# Patient Record
Sex: FEMALE | URBAN - METROPOLITAN AREA
[De-identification: names, ages, dates, MRNs, and addresses within clinical notes are randomized per-mention and may not be internally consistent; named-entity substitution may affect disease eponyms.]

---

## 2020-10-30 LAB
C. TRACHOMATIS, EXTERNAL RESULT: NEGATIVE
N. GONORRHOEAE, EXTERNAL RESULT: NEGATIVE

## 2020-11-18 LAB
ABO, EXTERNAL RESULT: NORMAL
HEP B, EXTERNAL RESULT: NEGATIVE
HEPATITIS C ANTIBODY, EXTERNAL RESULT: NEGATIVE
HIV, EXTERNAL RESULT: NORMAL
RH FACTOR, EXTERNAL RESULT: POSITIVE
RPR, EXTERNAL RESULT: NEGATIVE
RUBELLA TITER, EXTERNAL RESULT: NORMAL

## 2021-04-06 ENCOUNTER — TELEPHONE (OUTPATIENT)
Dept: OBGYN CLINIC | Age: 36
End: 2021-04-06

## 2021-04-07 ENCOUNTER — INITIAL PRENATAL (OUTPATIENT)
Dept: OBGYN CLINIC | Age: 36
End: 2021-04-07
Payer: COMMERCIAL

## 2021-04-07 VITALS
WEIGHT: 160.4 LBS | HEART RATE: 90 BPM | SYSTOLIC BLOOD PRESSURE: 108 MMHG | BODY MASS INDEX: 26.73 KG/M2 | HEIGHT: 65 IN | DIASTOLIC BLOOD PRESSURE: 76 MMHG

## 2021-04-07 DIAGNOSIS — Z34.83 PRENATAL CARE, SUBSEQUENT PREGNANCY IN THIRD TRIMESTER: Primary | ICD-10-CM

## 2021-04-07 DIAGNOSIS — O09.523 MULTIGRAVIDA OF ADVANCED MATERNAL AGE IN THIRD TRIMESTER: ICD-10-CM

## 2021-04-07 PROCEDURE — 0500F INITIAL PRENATAL CARE VISIT: CPT | Performed by: OBSTETRICS & GYNECOLOGY

## 2021-04-07 PROCEDURE — 36415 COLL VENOUS BLD VENIPUNCTURE: CPT | Performed by: OBSTETRICS & GYNECOLOGY

## 2021-04-07 NOTE — PROGRESS NOTES
Temp-98. 2f infrared  Maternal emotional well being screening form completed and reviewed with patient. Current score is 4. Patient given referral to [address] [place] [address] ([phone]): No  3:00pm 4/7/21 Given Tdap (Adacel) vaccine 0.5mL IM  Site:Right deltoid. Lot #[id number]  Expiration Date: 01/29/23  Jay Bautista 47 #[id number]. Patient tolerated well. No reaction noted after 20 minutes. VIS sheet provided. Administered by:  Josefina Garcia

## 2021-04-08 LAB
ABO/RH: NORMAL
ANTIBODY SCREEN: NORMAL
BASOPHILS ABSOLUTE: 0 K/UL (ref 0–0.2)
BASOPHILS RELATIVE PERCENT: 0.2 %
EOSINOPHILS ABSOLUTE: 0.1 K/UL (ref 0–0.6)
EOSINOPHILS RELATIVE PERCENT: 1.6 %
GLUCOSE CHALLENGE: 133 MG/DL
HCT VFR BLD CALC: 36.9 % (ref 36–48)
HEMOGLOBIN: 12.5 G/DL (ref 12–16)
LYMPHOCYTES ABSOLUTE: 1.4 K/UL (ref 1–5.1)
LYMPHOCYTES RELATIVE PERCENT: 17.1 %
MCH RBC QN AUTO: 31.3 PG (ref 26–34)
MCHC RBC AUTO-ENTMCNC: 33.8 G/DL (ref 31–36)
MCV RBC AUTO: 92.5 FL (ref 80–100)
MONOCYTES ABSOLUTE: 0.6 K/UL (ref 0–1.3)
MONOCYTES RELATIVE PERCENT: 7.1 %
NEUTROPHILS ABSOLUTE: 5.8 K/UL (ref 1.7–7.7)
NEUTROPHILS RELATIVE PERCENT: 74 %
PDW BLD-RTO: 13.4 % (ref 12.4–15.4)
PLATELET # BLD: 172 K/UL (ref 135–450)
PMV BLD AUTO: 9.4 FL (ref 5–10.5)
RBC # BLD: 3.98 M/UL (ref 4–5.2)
WBC # BLD: 7.9 K/UL (ref 4–11)

## 2021-04-16 ENCOUNTER — NURSE ONLY (OUTPATIENT)
Dept: OBGYN CLINIC | Age: 36
End: 2021-04-16
Payer: COMMERCIAL

## 2021-04-16 VITALS
HEART RATE: 75 BPM | WEIGHT: 163.2 LBS | BODY MASS INDEX: 27.58 KG/M2 | TEMPERATURE: 97.1 F | SYSTOLIC BLOOD PRESSURE: 118 MMHG | DIASTOLIC BLOOD PRESSURE: 64 MMHG

## 2021-04-16 DIAGNOSIS — O24.410 DIET CONTROLLED GESTATIONAL DIABETES MELLITUS (GDM) IN SECOND TRIMESTER: ICD-10-CM

## 2021-04-16 DIAGNOSIS — O99.019 ANEMIA DURING PREGNANCY: Primary | ICD-10-CM

## 2021-04-16 PROCEDURE — 36415 COLL VENOUS BLD VENIPUNCTURE: CPT | Performed by: OBSTETRICS & GYNECOLOGY

## 2021-04-17 LAB
BASOPHILS ABSOLUTE: 0 K/UL (ref 0–0.2)
BASOPHILS RELATIVE PERCENT: 0.3 %
EOSINOPHILS ABSOLUTE: 0.1 K/UL (ref 0–0.6)
EOSINOPHILS RELATIVE PERCENT: 1.4 %
GLUCOSE FASTING: 61 MG/DL
GLUCOSE TOLERANCE TEST 1 HOUR: 133 MG/DL
GLUCOSE TOLERANCE TEST 2 HOUR: 124 MG/DL
GLUCOSE TOLERANCE TEST 3 HOUR: 99 MG/DL
HCT VFR BLD CALC: 35.5 % (ref 36–48)
HEMOGLOBIN: 11.8 G/DL (ref 12–16)
LYMPHOCYTES ABSOLUTE: 1.4 K/UL (ref 1–5.1)
LYMPHOCYTES RELATIVE PERCENT: 14.5 %
MCH RBC QN AUTO: 30.7 PG (ref 26–34)
MCHC RBC AUTO-ENTMCNC: 33.2 G/DL (ref 31–36)
MCV RBC AUTO: 92.6 FL (ref 80–100)
MONOCYTES ABSOLUTE: 0.7 K/UL (ref 0–1.3)
MONOCYTES RELATIVE PERCENT: 7.3 %
NEUTROPHILS ABSOLUTE: 7.2 K/UL (ref 1.7–7.7)
NEUTROPHILS RELATIVE PERCENT: 76.5 %
PDW BLD-RTO: 13 % (ref 12.4–15.4)
PLATELET # BLD: 176 K/UL (ref 135–450)
PMV BLD AUTO: 9.5 FL (ref 5–10.5)
RBC # BLD: 3.84 M/UL (ref 4–5.2)
WBC # BLD: 9.5 K/UL (ref 4–11)

## 2021-04-18 PROBLEM — O09.523 MULTIGRAVIDA OF ADVANCED MATERNAL AGE IN THIRD TRIMESTER: Status: ACTIVE | Noted: 2021-04-18

## 2021-04-18 PROBLEM — Z34.83 PRENATAL CARE, SUBSEQUENT PREGNANCY IN THIRD TRIMESTER: Status: ACTIVE | Noted: 2021-04-18

## 2021-04-19 NOTE — PROGRESS NOTES
28 y/o  female at 30 weeks 1 day gestation with [facility] 6/15/2021 presents for prenatal transfer of care. Pregnancy is complicated by late transfer of care and AMA  Patient is in process of moving from [place] to [place]. First pregnancy was relatively uncomplicated and ended in  at term (nuchal cord at delivery). Denies vaginal bleeding, loss of fluid, contractions, and decreased fetal movement. Denies headaches, vision changes and RUQ pain. Admits to mild baseline shortness of breath. Denies fever, chills, chest pain, nausea, vomiting, diarrhea, constipation, dysuria and hematuria. No Known Allergies  Current Outpatient Medications on File Prior to Visit   Medication Sig Dispense Refill    Prenatal Vit-Fe Fumarate-FA (PRENATAL 1+1 PO) Take by mouth       No current facility-administered medications on file prior to visit. Past Medical History:   Diagnosis Date    Abnormal Pap smear of cervix     Herpes simplex virus (HSV) infection      History reviewed. No pertinent surgical history. Social History     Tobacco Use    Smoking status: Never Smoker    Smokeless tobacco: Never Used   Substance Use Topics    Alcohol use: Not Currently    Drug use: Never     Family History   Problem Relation Age of Onset    Breast Cancer Paternal Grandmother     Hypertension Father     Cancer Mother      OB History    Para Term  AB Living   2 1 1     1   SAB TAB Ectopic Molar Multiple Live Births             1      # Outcome Date GA Lbr Sincere/2nd Weight Sex Delivery Anes PTL Lv   2 Current            1 Term 07/10/18    M Vag-Spont EPI  BERRY      Diagnosis Orders   1. Prenatal care, subsequent pregnancy in third trimester  CBC WITH AUTO DIFFERENTIAL    GLUCOSE CHALLENGE GESTATIONAL    TYPE AND SCREEN   2.  Multigravida of advanced maternal age in third trimester       Orders Placed This Encounter   Procedures    CBC WITH AUTO DIFFERENTIAL    GLUCOSE CHALLENGE GESTATIONAL    TYPE AND SCREEN     Records requested: lab work, prenatal record, ultrasound (had level 2 ultrasound at 18 weeks per patient). Schedule growth ultrasound in 2-4 weeks. Weekly NST, VINAY at 32-34 weeks.    Follow up prn and 2 weeks

## 2021-04-20 ENCOUNTER — ROUTINE PRENATAL (OUTPATIENT)
Dept: OBGYN CLINIC | Age: 36
End: 2021-04-20

## 2021-04-20 VITALS
WEIGHT: 164.6 LBS | HEART RATE: 101 BPM | BODY MASS INDEX: 27.82 KG/M2 | DIASTOLIC BLOOD PRESSURE: 70 MMHG | SYSTOLIC BLOOD PRESSURE: 101 MMHG

## 2021-04-20 DIAGNOSIS — Z34.83 PRENATAL CARE, SUBSEQUENT PREGNANCY IN THIRD TRIMESTER: Primary | ICD-10-CM

## 2021-04-20 DIAGNOSIS — O09.523 MULTIGRAVIDA OF ADVANCED MATERNAL AGE IN THIRD TRIMESTER: ICD-10-CM

## 2021-04-20 PROCEDURE — 0502F SUBSEQUENT PRENATAL CARE: CPT | Performed by: OBSTETRICS & GYNECOLOGY

## 2021-04-20 ASSESSMENT — PATIENT HEALTH QUESTIONNAIRE - PHQ9
SUM OF ALL RESPONSES TO PHQ QUESTIONS 1-9: 0
1. LITTLE INTEREST OR PLEASURE IN DOING THINGS: 0
SUM OF ALL RESPONSES TO PHQ QUESTIONS 1-9: 0
SUM OF ALL RESPONSES TO PHQ9 QUESTIONS 1 & 2: 0
2. FEELING DOWN, DEPRESSED OR HOPELESS: 0
SUM OF ALL RESPONSES TO PHQ QUESTIONS 1-9: 0

## 2021-04-20 NOTE — PROGRESS NOTES
Maternal emotional well being screening form completed and reviewed with patient. Current score is 1.    Temp.97.9

## 2021-04-24 PROBLEM — R73.09 ELEVATED GLUCOSE TOLERANCE TEST: Status: ACTIVE | Noted: 2021-04-24

## 2021-04-24 NOTE — PROGRESS NOTES
30 y/o  female at 32 weeks 0 days gestation with [facility] 6/15/2021 presents for prenatal visit. Pregnancy is complicated by late transfer of care and AMA. Patient is in process of moving from [place] to [place]. First pregnancy was relatively uncomplicated and ended in  at term (nuchal cord at delivery). Denies vaginal bleeding, loss of fluid, contractions, and decreased fetal movement. Admits to headaches and blurred vision. Feels symptoms may be due to lack of hydration--not enough water. Denies RUQ pain  Denies fever, chills, chest pain, nausea, vomiting, diarrhea, constipation, dysuria and hematuria. Diagnosis Orders   1. Prenatal care, subsequent pregnancy in third trimester     2. Multigravida of advanced maternal age in third trimester           Follow up prn and 2 weeks for prenatal visit. Weekly NST, VINAY at 32-34 weeks  Growth ultrasound in 2 weeks.

## 2021-05-05 ENCOUNTER — OFFICE VISIT (OUTPATIENT)
Dept: OBGYN CLINIC | Age: 36
End: 2021-05-05
Payer: COMMERCIAL

## 2021-05-05 ENCOUNTER — ROUTINE PRENATAL (OUTPATIENT)
Dept: OBGYN CLINIC | Age: 36
End: 2021-05-05
Payer: COMMERCIAL

## 2021-05-05 VITALS
HEART RATE: 88 BPM | DIASTOLIC BLOOD PRESSURE: 74 MMHG | BODY MASS INDEX: 28.22 KG/M2 | SYSTOLIC BLOOD PRESSURE: 118 MMHG | WEIGHT: 167 LBS

## 2021-05-05 DIAGNOSIS — R73.09 ELEVATED GLUCOSE TOLERANCE TEST: ICD-10-CM

## 2021-05-05 DIAGNOSIS — O09.523 MULTIGRAVIDA OF ADVANCED MATERNAL AGE IN THIRD TRIMESTER: ICD-10-CM

## 2021-05-05 DIAGNOSIS — O09.523 MULTIGRAVIDA OF ADVANCED MATERNAL AGE IN THIRD TRIMESTER: Primary | ICD-10-CM

## 2021-05-05 DIAGNOSIS — Z34.83 PRENATAL CARE, SUBSEQUENT PREGNANCY IN THIRD TRIMESTER: ICD-10-CM

## 2021-05-05 DIAGNOSIS — Z34.83 PRENATAL CARE, SUBSEQUENT PREGNANCY IN THIRD TRIMESTER: Primary | ICD-10-CM

## 2021-05-05 DIAGNOSIS — O98.513 HERPES SIMPLEX VIRUS TYPE 2 (HSV-2) INFECTION AFFECTING PREGNANCY IN THIRD TRIMESTER: ICD-10-CM

## 2021-05-05 DIAGNOSIS — B00.9 HERPES SIMPLEX VIRUS TYPE 2 (HSV-2) INFECTION AFFECTING PREGNANCY IN THIRD TRIMESTER: ICD-10-CM

## 2021-05-05 LAB
ABDOMINAL CIRCUMFERENCE: NORMAL
BIPARIETAL DIAMETER: NORMAL
ESTIMATED FETAL WEIGHT: NORMAL
FEMORAL DIAMETER: NORMAL
HC/AC: NORMAL
HEAD CIRCUMFERENCE: NORMAL

## 2021-05-05 PROCEDURE — 59025 FETAL NON-STRESS TEST: CPT | Performed by: OBSTETRICS & GYNECOLOGY

## 2021-05-05 PROCEDURE — 76816 OB US FOLLOW-UP PER FETUS: CPT | Performed by: OBSTETRICS & GYNECOLOGY

## 2021-05-05 PROCEDURE — 0502F SUBSEQUENT PRENATAL CARE: CPT | Performed by: OBSTETRICS & GYNECOLOGY

## 2021-05-05 RX ORDER — VALACYCLOVIR HYDROCHLORIDE 500 MG/1
500 TABLET, FILM COATED ORAL DAILY
Qty: 90 TABLET | Refills: 3 | Status: SHIPPED | OUTPATIENT
Start: 2021-05-05

## 2021-05-14 ENCOUNTER — OFFICE VISIT (OUTPATIENT)
Dept: OBGYN CLINIC | Age: 36
End: 2021-05-14
Payer: COMMERCIAL

## 2021-05-14 ENCOUNTER — ROUTINE PRENATAL (OUTPATIENT)
Dept: OBGYN CLINIC | Age: 36
End: 2021-05-14

## 2021-05-14 VITALS
WEIGHT: 167.6 LBS | SYSTOLIC BLOOD PRESSURE: 118 MMHG | BODY MASS INDEX: 28.32 KG/M2 | HEART RATE: 97 BPM | DIASTOLIC BLOOD PRESSURE: 80 MMHG

## 2021-05-14 DIAGNOSIS — O09.523 MULTIGRAVIDA OF ADVANCED MATERNAL AGE IN THIRD TRIMESTER: ICD-10-CM

## 2021-05-14 DIAGNOSIS — O09.523 MULTIGRAVIDA OF ADVANCED MATERNAL AGE IN THIRD TRIMESTER: Primary | ICD-10-CM

## 2021-05-14 DIAGNOSIS — Z34.83 PRENATAL CARE, SUBSEQUENT PREGNANCY IN THIRD TRIMESTER: Primary | ICD-10-CM

## 2021-05-14 LAB — GBS, EXTERNAL RESULT: NEGATIVE

## 2021-05-14 PROCEDURE — 0502F SUBSEQUENT PRENATAL CARE: CPT | Performed by: OBSTETRICS & GYNECOLOGY

## 2021-05-14 PROCEDURE — 76815 OB US LIMITED FETUS(S): CPT | Performed by: OBSTETRICS & GYNECOLOGY

## 2021-05-14 NOTE — PROGRESS NOTES
Return OB Office Visit    CC:   Chief Complaint   Patient presents with    Routine Prenatal Visit       HPI:  Pt seen and examined. No concerns/complaints. Denies VB, LOF. +FM. Occasional cramps. Maternal wellness questionnaire reviewed - no concerns today. Score 0. Objective:  /80   Pulse 97   Wt 167 lb 9.6 oz (76 kg)   LMP 2020 (Exact Date)   BMI 28.32 kg/m²   Gen: AO, NAD  Abd: Soft, NT  FHT: 130  SVE: 1/25/-3    Assessment/Plan:  29 y.o.  at 35w3d (Estimated Date of Delivery: 6/15/21) presents for DEEPA appointment:      Diagnosis Orders   1. Prenatal care, subsequent pregnancy in third trimester  Culture, Strep B Screen, Vaginal/Rectal   2.  Multigravida of advanced maternal age in third trimester       Doing well today, routine care  - FWB reassuring on modified BPP today  - GBS sent today    Dispo: RTC in 1 week  Nagi Hadley MD

## 2021-05-15 PROBLEM — B00.9 HERPES SIMPLEX VIRUS TYPE 2 (HSV-2) INFECTION AFFECTING PREGNANCY IN THIRD TRIMESTER: Status: ACTIVE | Noted: 2021-05-15

## 2021-05-15 PROBLEM — O98.513 HERPES SIMPLEX VIRUS TYPE 2 (HSV-2) INFECTION AFFECTING PREGNANCY IN THIRD TRIMESTER: Status: ACTIVE | Noted: 2021-05-15

## 2021-05-15 NOTE — PROGRESS NOTES
30 y/o  female at 34 weeks 1 day gestation with [facility] 6/15/2021 presents for prenatal visit. Pregnancy is complicated by late transfer of care, history of HSV 2 and AMA. Patient is in process of moving from [place] to [place]. First pregnancy was relatively uncomplicated and ended in  at term (nuchal cord at delivery). Denies vaginal bleeding, loss of fluid, contractions, and decreased fetal movement. Denies headaches, vision changes and RUQ pain  Denies fever, chills, chest pain, nausea, vomiting, diarrhea, constipation, dysuria and hematuria. NON STRESS TEST    DATE: 2021    : Osbaldo Valentin. REZA Falcon    COMPARISON: none    INDICATION:  AMA    IMPRESSION:  Fetal heart tones: 130's,  NST category: 1, reactive,  Tocometry: no contractions. OBSTETRIC ULTRASOUND GROWTH    DATE: 2021    PHYSICIAN: ISIDRO Falcon D.O.     SONOGRAPHER: Pearl Garcia [facility]    INDICATION: Growth    TYPE OF SCAN: abdominal    FINDINGS:  A single viable intrauterine pregnancy is noted in cephalic presentation. Cardiac and somatic activity are noted. The following values were obtained:   Fetal heart rate    142 bpm   BPD      8.49cm  49.9 %   Head Circumference    32.01cm  64.4 %    Abdominal Circumference   30.53cm  64.4 %   Femur Length     6.44cm  18.8 %   Amniotic fluid index    20.09cm   EFW      2392g  48.0 percentile    Amniotic fluid volume is normal. Based on sonographic criteria the estimated fetal age is 34 weeks and 4 days with EDC of 21. There is a 3 day discordance with the established EDC of 6/15/21. The patient has an anterior placenta that is adequate distance in relation to the internal cervical os. The evaluation of the lower uterine segment and cervix reveals normal appearing anatomy. The uterus is unremarkable/gravid. Maternal ovaries and adnexae are not well visualized due to the size of the uterus and patient's gravid state.     Anatomy seen includes: heart, stomach, kidneys, bladder    IMPRESSION:  Single live IUP in the third trimester. Adequate interval fetal growth. Imaging is limited secondary to fetal position. The patient is well aware of the limitations of ultrasound in the detection of anomalies. Diagnosis Orders   1. Prenatal care, subsequent pregnancy in third trimester  18492 - [place] FETAL NON-STRESS TEST   2. Multigravida of advanced maternal age in third trimester  0 - [place] FETAL NON-STRESS TEST   3. Herpes simplex virus type 2 (HSV-2) infection affecting pregnancy in third trimester     4. Elevated glucose tolerance test       Orders Placed This Encounter   Procedures    16973 - [place] FETAL NON-STRESS TEST     Rx Valtrex 500 mg QD  Reassuring fetal monitoring. Follow up prn and 1 week for prenatal visit and NST.

## 2021-05-17 LAB — GROUP B STREP CULTURE: NORMAL

## 2021-05-21 ENCOUNTER — OFFICE VISIT (OUTPATIENT)
Dept: OBGYN CLINIC | Age: 36
End: 2021-05-21
Payer: COMMERCIAL

## 2021-05-21 ENCOUNTER — ROUTINE PRENATAL (OUTPATIENT)
Dept: OBGYN CLINIC | Age: 36
End: 2021-05-21

## 2021-05-21 VITALS
BODY MASS INDEX: 28.56 KG/M2 | WEIGHT: 169 LBS | HEART RATE: 85 BPM | DIASTOLIC BLOOD PRESSURE: 78 MMHG | SYSTOLIC BLOOD PRESSURE: 120 MMHG

## 2021-05-21 DIAGNOSIS — Z34.83 PRENATAL CARE, SUBSEQUENT PREGNANCY IN THIRD TRIMESTER: Primary | ICD-10-CM

## 2021-05-21 DIAGNOSIS — O98.513 HERPES SIMPLEX VIRUS TYPE 2 (HSV-2) INFECTION AFFECTING PREGNANCY IN THIRD TRIMESTER: ICD-10-CM

## 2021-05-21 DIAGNOSIS — O09.523 MULTIGRAVIDA OF ADVANCED MATERNAL AGE IN THIRD TRIMESTER: ICD-10-CM

## 2021-05-21 DIAGNOSIS — O09.523 MULTIGRAVIDA OF ADVANCED MATERNAL AGE IN THIRD TRIMESTER: Primary | ICD-10-CM

## 2021-05-21 DIAGNOSIS — B00.9 HERPES SIMPLEX VIRUS TYPE 2 (HSV-2) INFECTION AFFECTING PREGNANCY IN THIRD TRIMESTER: ICD-10-CM

## 2021-05-21 PROCEDURE — 76815 OB US LIMITED FETUS(S): CPT | Performed by: OBSTETRICS & GYNECOLOGY

## 2021-05-21 PROCEDURE — 0502F SUBSEQUENT PRENATAL CARE: CPT | Performed by: OBSTETRICS & GYNECOLOGY

## 2021-05-21 NOTE — PROGRESS NOTES
Return OB Office Visit    CC:   Chief Complaint   Patient presents with    Routine Prenatal Visit       HPI:  Pt seen and examined. No concerns/complaints. Denies VB, LOF. +FM. Occasional cramps/Aurelio-Louis every 15 minutes at times. Otherwise doing OK today. Maternal wellness questionnaire reviewed - no concerns today. Score 0. Objective:  /78   Pulse 85   Wt 169 lb (76.7 kg)   LMP 2020 (Exact Date)   BMI 28.56 kg/m²   Gen: AO, NAD  Abd: Soft, NT  FHT: 130  SVE: 3    Assessment/Plan:  29 y.o.  at 36w3d (Estimated Date of Delivery: 6/15/21) presents for DEEPA appointment:     Diagnosis Orders   1. Prenatal care, subsequent pregnancy in third trimester     2. Multigravida of advanced maternal age in third trimester     3.  Herpes simplex virus type 2 (HSV-2) infection affecting pregnancy in third trimester       Doing well, routine care  - FWB reassuring today on modified BPP  - GBS negative  - continue valtrex    Dispo: RTC in 1 week  Baron Nathen MD

## 2021-05-27 ENCOUNTER — ROUTINE PRENATAL (OUTPATIENT)
Dept: OBGYN CLINIC | Age: 36
End: 2021-05-27
Payer: COMMERCIAL

## 2021-05-27 ENCOUNTER — OFFICE VISIT (OUTPATIENT)
Dept: OBGYN CLINIC | Age: 36
End: 2021-05-27
Payer: COMMERCIAL

## 2021-05-27 VITALS
SYSTOLIC BLOOD PRESSURE: 112 MMHG | BODY MASS INDEX: 28.63 KG/M2 | WEIGHT: 169.4 LBS | HEART RATE: 76 BPM | DIASTOLIC BLOOD PRESSURE: 78 MMHG

## 2021-05-27 DIAGNOSIS — O99.019 ANEMIA DURING PREGNANCY: ICD-10-CM

## 2021-05-27 DIAGNOSIS — B00.9 HERPES SIMPLEX VIRUS TYPE 2 (HSV-2) INFECTION AFFECTING PREGNANCY IN THIRD TRIMESTER: ICD-10-CM

## 2021-05-27 DIAGNOSIS — O09.523 MULTIGRAVIDA OF ADVANCED MATERNAL AGE IN THIRD TRIMESTER: Primary | ICD-10-CM

## 2021-05-27 DIAGNOSIS — O98.513 HERPES SIMPLEX VIRUS TYPE 2 (HSV-2) INFECTION AFFECTING PREGNANCY IN THIRD TRIMESTER: ICD-10-CM

## 2021-05-27 DIAGNOSIS — R73.09 ELEVATED GLUCOSE TOLERANCE TEST: ICD-10-CM

## 2021-05-27 DIAGNOSIS — Z34.83 PRENATAL CARE, SUBSEQUENT PREGNANCY IN THIRD TRIMESTER: Primary | ICD-10-CM

## 2021-05-27 DIAGNOSIS — O09.523 MULTIGRAVIDA OF ADVANCED MATERNAL AGE IN THIRD TRIMESTER: ICD-10-CM

## 2021-05-27 PROCEDURE — 0502F SUBSEQUENT PRENATAL CARE: CPT | Performed by: OBSTETRICS & GYNECOLOGY

## 2021-05-27 PROCEDURE — 59025 FETAL NON-STRESS TEST: CPT | Performed by: OBSTETRICS & GYNECOLOGY

## 2021-05-27 PROCEDURE — 76815 OB US LIMITED FETUS(S): CPT | Performed by: OBSTETRICS & GYNECOLOGY

## 2021-05-27 NOTE — PROGRESS NOTES
Temp-98.4F infrared  Maternal emotional well being screening form completed and reviewed with patient. Current score is 0. Patient given referral to [facility] ([phone]):  No

## 2021-05-28 NOTE — PROGRESS NOTES
29 y.o.  at 37w3d EGA Estimated Date of Delivery: 6/15/21 here for DEEPA:     Pt seen and examined. No concerns/complaints. Denies VB, LOF. Does admit to CNTX every 5-10 min for the past few days, sometimes uncomfortable. Endorses (+) FM. Denies fevers / chills / chest pain / shortness of breath. Denies HA, changes with vision, RUQ pain, edema. MWQ reviewed (Score:0). Objective:  /78   Pulse 76   Wt 169 lb 6.4 oz (76.8 kg)   LMP 2020 (Exact Date)   BMI 28.63 kg/m²   Gen: AO, NAD  Abd: Soft, NT, gravid   Ext: Mild LE edema  OMM: Increased lumbar lordosis    NST:  Reactive / Cat 1     Images:  OB ULTRASOUND: VINAY    DATE: 21    PHYSICIAN: PAT Leon D.O.    SONOGRAPHER: {blank single:[id number]::\"TIFFANY Mcbride RDMS\",\"LAY Larson RDMS\"    INDICATION: Fetal well being    TYPE OF SCAN: abdominal    FINDINGS:  A single viable intrauterine pregnancy is noted in cephalic presentation. Cardiac and somatic activity are noted. The following values were obtained:   Fetal heart rate 147bpm   Amniotic fluid index 20.18cm    IMPRESSION:   Single live IUP in the third trimester. VINAY 20.18cm. Assessment/Plan:   Diagnosis Orders   1. Prenatal care, subsequent pregnancy in third trimester     2. Multigravida of advanced maternal age in third trimester  [place] FETAL NON-STRESS TEST   3. Herpes simplex virus type 2 (HSV-2) infection affecting pregnancy in third trimester     4. Elevated glucose tolerance test     5. Anemia during pregnancy        - reassuring maternal / fetal status   - plan for growth at next visit (permission given to bring in  as he has not been in for apt yet)   - GBS (-)    Follow Up  Return OB precautions reviewed   Return in about 1 week (around 6/3/2021) for Return OB visit, Ultrasound, NST.     Roderick Search, DO

## 2021-06-02 ENCOUNTER — OFFICE VISIT (OUTPATIENT)
Dept: OBGYN CLINIC | Age: 36
End: 2021-06-02
Payer: COMMERCIAL

## 2021-06-02 ENCOUNTER — ROUTINE PRENATAL (OUTPATIENT)
Dept: OBGYN CLINIC | Age: 36
End: 2021-06-02
Payer: COMMERCIAL

## 2021-06-02 VITALS
BODY MASS INDEX: 29.34 KG/M2 | DIASTOLIC BLOOD PRESSURE: 72 MMHG | WEIGHT: 173.6 LBS | SYSTOLIC BLOOD PRESSURE: 128 MMHG | HEART RATE: 94 BPM

## 2021-06-02 DIAGNOSIS — R73.09 ELEVATED GLUCOSE TOLERANCE TEST: ICD-10-CM

## 2021-06-02 DIAGNOSIS — O98.513 HERPES SIMPLEX VIRUS TYPE 2 (HSV-2) INFECTION AFFECTING PREGNANCY IN THIRD TRIMESTER: ICD-10-CM

## 2021-06-02 DIAGNOSIS — Z34.83 PRENATAL CARE, SUBSEQUENT PREGNANCY IN THIRD TRIMESTER: Primary | ICD-10-CM

## 2021-06-02 DIAGNOSIS — B00.9 HERPES SIMPLEX VIRUS TYPE 2 (HSV-2) INFECTION AFFECTING PREGNANCY IN THIRD TRIMESTER: ICD-10-CM

## 2021-06-02 DIAGNOSIS — O34.10 UTERINE FIBROID DURING PREGNANCY, ANTEPARTUM: ICD-10-CM

## 2021-06-02 DIAGNOSIS — D25.9 UTERINE FIBROID DURING PREGNANCY, ANTEPARTUM: ICD-10-CM

## 2021-06-02 DIAGNOSIS — O09.523 MULTIGRAVIDA OF ADVANCED MATERNAL AGE IN THIRD TRIMESTER: ICD-10-CM

## 2021-06-02 PROCEDURE — 59025 FETAL NON-STRESS TEST: CPT | Performed by: OBSTETRICS & GYNECOLOGY

## 2021-06-02 PROCEDURE — 0502F SUBSEQUENT PRENATAL CARE: CPT | Performed by: OBSTETRICS & GYNECOLOGY

## 2021-06-02 PROCEDURE — 76816 OB US FOLLOW-UP PER FETUS: CPT | Performed by: OBSTETRICS & GYNECOLOGY

## 2021-06-02 NOTE — PROGRESS NOTES
Temp 97.2 Oral F    Maternal emotional well being screening form completed and reviewed with patient. Current score is 0.

## 2021-06-07 ENCOUNTER — OFFICE VISIT (OUTPATIENT)
Dept: OBGYN CLINIC | Age: 36
End: 2021-06-07
Payer: COMMERCIAL

## 2021-06-07 ENCOUNTER — ROUTINE PRENATAL (OUTPATIENT)
Dept: OBGYN CLINIC | Age: 36
End: 2021-06-07

## 2021-06-07 VITALS
HEART RATE: 83 BPM | WEIGHT: 172 LBS | SYSTOLIC BLOOD PRESSURE: 122 MMHG | DIASTOLIC BLOOD PRESSURE: 80 MMHG | BODY MASS INDEX: 29.07 KG/M2

## 2021-06-07 DIAGNOSIS — D25.9 UTERINE FIBROID DURING PREGNANCY, ANTEPARTUM: ICD-10-CM

## 2021-06-07 DIAGNOSIS — Z34.83 PRENATAL CARE, SUBSEQUENT PREGNANCY IN THIRD TRIMESTER: Primary | ICD-10-CM

## 2021-06-07 DIAGNOSIS — R73.09 ELEVATED GLUCOSE TOLERANCE TEST: ICD-10-CM

## 2021-06-07 DIAGNOSIS — O09.523 MULTIGRAVIDA OF ADVANCED MATERNAL AGE IN THIRD TRIMESTER: ICD-10-CM

## 2021-06-07 DIAGNOSIS — O98.513 HERPES SIMPLEX VIRUS TYPE 2 (HSV-2) INFECTION AFFECTING PREGNANCY IN THIRD TRIMESTER: ICD-10-CM

## 2021-06-07 DIAGNOSIS — B00.9 HERPES SIMPLEX VIRUS TYPE 2 (HSV-2) INFECTION AFFECTING PREGNANCY IN THIRD TRIMESTER: ICD-10-CM

## 2021-06-07 DIAGNOSIS — O34.10 UTERINE FIBROID DURING PREGNANCY, ANTEPARTUM: ICD-10-CM

## 2021-06-07 PROCEDURE — 0502F SUBSEQUENT PRENATAL CARE: CPT | Performed by: OBSTETRICS & GYNECOLOGY

## 2021-06-07 PROCEDURE — 76815 OB US LIMITED FETUS(S): CPT | Performed by: OBSTETRICS & GYNECOLOGY

## 2021-06-07 NOTE — PROGRESS NOTES
Temp 98.2 Oral F    Maternal emotional well being screening form completed and reviewed with patient. Current score is 0.

## 2021-06-07 NOTE — PROGRESS NOTES
30 y/o  female at 34 weeks 1 day gestation with [facility] 6/15/2021 presents for prenatal visit. Pregnancy is complicated by late transfer of care, uterine fibroids history of HSV 2 and AMA. Moving from [place] to [place]. First pregnancy was relatively uncomplicated and ended in  at term (nuchal cord at delivery). Denies vaginal bleeding, loss of fluid, regular contractions, and decreased fetal movement. Denies headaches, vision changes and RUQ pain  Denies fever, chills, chest pain, nausea, vomiting, diarrhea, constipation, dysuria and hematuria  Maternal Wellness Questionnaire reviewed--reassurance    OBSTETRIC ULTRASOUND GROWTH    DATE: 2021. PHYSICIAN: ISIDRO Falcon D.O.     SONOGRAPHER: Noel Jules RDMS    INDICATION: Growth    TYPE OF SCAN: abdominal    FINDINGS:  A single viable intrauterine pregnancy is noted in cephalic presentation. Cardiac activity  noted. The following values were obtained:   Fetal heart rate    157 bpm   BPD      9.11cm  41.6 %   Head Circumference    34.70cm  79.3 %    Abdominal Circumference   35.96cm  96.2 %   Femur Length     7.70cm  81.6 %   Amniotic fluid index    15.09cm   EFW      3778g  88.2 percentile    Amniotic fluid volume is normal. Based on sonographic criteria the estimated fetal age is 39 weeks and 1 day with EDC of 2021. There is a 7 day discordance with the established EDC of 06/15/2021. The patient has an anterior placenta that is adequate distance in relation to the internal cervical os. The evaluation of the lower uterine segment and cervix reveals normal appearing anatomy. The uterus is unremarkable/gravid. Uterine Myoma  noted  Measuring 4.47 cm x 2.62 cm x 4.16 cm. Maternal ovaries and adnexae are not well visualized due to the size of the uterus and patient's gravid state. Anatomy seen includes: heart, stomach, kidneys, bladder    IMPRESSION:  Single live IUP in the third trimester. Adequate interval fetal growth. Imaging is limited secondary to gestational age. The patient is well aware of the limitations of ultrasound in the detection of anomalies. Right lateral myoma measures 4.4 x 4.6 x 3.6      NON STRESS TEST     DATE: 6/2/2021     : Rin Falcon D.O.     COMPARISON: 5/27/2021     IMPRESSION:  Fetal heart tones: 130's,  NST category: 1, reactive,  Tocometry: occasional contractions. Diagnosis Orders   1. Prenatal care, subsequent pregnancy in third trimester  61276 - [place] FETAL NON-STRESS TEST   2. Multigravida of advanced maternal age in third trimester  0 - [place] FETAL NON-STRESS TEST   3. Herpes simplex virus type 2 (HSV-2) infection affecting pregnancy in third trimester     4. Elevated glucose tolerance test  24464 - [place] FETAL NON-STRESS TEST   5. Uterine fibroid during pregnancy, antepartum         Orders Placed This Encounter   Procedures    97228 - [place] FETAL NON-STRESS TEST     Labor precautions, kick counts  Follow up prn and 1 week for prenatal visit and fetal monitoring. Zeyad Adan

## 2021-06-08 NOTE — PROGRESS NOTES
28 y/o  female at 38 weeks 6 days gestation with [facility] 6/15/2021 presents for prenatal visit. Pregnancy is complicated by late transfer of care, history of HSV 2 and AMA. Patient recently moved from [place]    First pregnancy was relatively uncomplicated and ended in  at term (nuchal cord at delivery). Denies vaginal bleeding, loss of fluid, contractions, and decreased fetal movement. Denies headaches, vision changes and RUQ pain  Denies fever, chills, chest pain, nausea, vomiting, diarrhea, constipation, dysuria and hematuria. Maternal Wellness Questionnaire reviewed--no concerns    / vertex    OB ULTRASOUND: VINAY and NST    DATE: 2021. PHYSICIAN: ISIDRO Falcon D.O.    SONOGRAPHER: TIFFANY Mcbride RDMS    INDICATION: Fetal well being    TYPE OF SCAN: abdominal    FINDINGS:  A single viable intrauterine pregnancy is noted in cephalic presentation. Cardiac and somatic activity are noted. The following values were obtained:   Fetal heart rate 128 bpm   Amniotic fluid index 17.03cm    NON STRESS TEST    : ISIDRO Falcon D.O.    COMPARISON: 6/3/2021    IMPRESSION:  Fetal heart tones: 130's,  NST category: 1, reactive,  Tocometry: no contractions. IMPRESSION:   Single live IUP in the third trimester. VINAY 17.03cm. Reactive category 1 tracing    Imaging is limited secondary to gestational age. The patient is well aware of the limitations of ultrasound in the detection of anomalies. Diagnosis Orders   1. Prenatal care, subsequent pregnancy in third trimester     2. Multigravida of advanced maternal age in third trimester     3. Herpes simplex virus type 2 (HSV-2) infection affecting pregnancy in third trimester     4. Uterine fibroid during pregnancy, antepartum     5. Elevated glucose tolerance test       Labor precautions, kick counts  Induction of labor versus expectant management discussed. Risks and benefits discussed. Induction of labor scheduled for 2021. Follow up for pitocin induction

## 2021-06-09 ENCOUNTER — ANESTHESIA EVENT (OUTPATIENT)
Dept: LABOR AND DELIVERY | Age: 36
End: 2021-06-09
Payer: COMMERCIAL

## 2021-06-09 ENCOUNTER — ANESTHESIA (OUTPATIENT)
Dept: LABOR AND DELIVERY | Age: 36
End: 2021-06-09
Payer: COMMERCIAL

## 2021-06-09 ENCOUNTER — APPOINTMENT (OUTPATIENT)
Dept: LABOR AND DELIVERY | Age: 36
End: 2021-06-09
Payer: COMMERCIAL

## 2021-06-09 ENCOUNTER — HOSPITAL ENCOUNTER (INPATIENT)
Age: 36
LOS: 1 days | Discharge: HOME | End: 2021-06-10
Attending: OBSTETRICS & GYNECOLOGY | Admitting: OBSTETRICS & GYNECOLOGY
Payer: COMMERCIAL

## 2021-06-09 PROBLEM — Z37.9 NORMAL LABOR: Status: ACTIVE | Noted: 2021-06-09

## 2021-06-09 LAB
ABO/RH: NORMAL
AMPHETAMINE SCREEN, URINE: NORMAL
ANTIBODY SCREEN: NORMAL
BARBITURATE SCREEN URINE: NORMAL
BASOPHILS ABSOLUTE: 0 K/UL (ref 0–0.2)
BASOPHILS RELATIVE PERCENT: 0.3 %
BENZODIAZEPINE SCREEN, URINE: NORMAL
BUPRENORPHINE URINE: NORMAL
CANNABINOID SCREEN URINE: NORMAL
COCAINE METABOLITE SCREEN URINE: NORMAL
EOSINOPHILS ABSOLUTE: 0.1 K/UL (ref 0–0.6)
EOSINOPHILS RELATIVE PERCENT: 1.6 %
HCT VFR BLD CALC: 35.9 % (ref 36–48)
HEMOGLOBIN: 12.1 G/DL (ref 12–16)
LYMPHOCYTES ABSOLUTE: 1.6 K/UL (ref 1–5.1)
LYMPHOCYTES RELATIVE PERCENT: 18.5 %
Lab: NORMAL
MCH RBC QN AUTO: 30.3 PG (ref 26–34)
MCHC RBC AUTO-ENTMCNC: 33.6 G/DL (ref 31–36)
MCV RBC AUTO: 90.1 FL (ref 80–100)
METHADONE SCREEN, URINE: NORMAL
MONOCYTES ABSOLUTE: 0.7 K/UL (ref 0–1.3)
MONOCYTES RELATIVE PERCENT: 8 %
NEUTROPHILS ABSOLUTE: 6.2 K/UL (ref 1.7–7.7)
NEUTROPHILS RELATIVE PERCENT: 71.6 %
OPIATE SCREEN URINE: NORMAL
OXYCODONE URINE: NORMAL
PDW BLD-RTO: 13.5 % (ref 12.4–15.4)
PH UA: 6
PHENCYCLIDINE SCREEN URINE: NORMAL
PLATELET # BLD: 170 K/UL (ref 135–450)
PMV BLD AUTO: 9.7 FL (ref 5–10.5)
PROPOXYPHENE SCREEN: NORMAL
RBC # BLD: 3.99 M/UL (ref 4–5.2)
WBC # BLD: 8.6 K/UL (ref 4–11)

## 2021-06-09 PROCEDURE — 86850 RBC ANTIBODY SCREEN: CPT

## 2021-06-09 PROCEDURE — 85025 COMPLETE CBC W/AUTO DIFF WBC: CPT

## 2021-06-09 PROCEDURE — 10907ZC DRAINAGE OF AMNIOTIC FLUID, THERAPEUTIC FROM PRODUCTS OF CONCEPTION, VIA NATURAL OR ARTIFICIAL OPENING: ICD-10-PCS | Performed by: OBSTETRICS & GYNECOLOGY

## 2021-06-09 PROCEDURE — 2500000003 HC RX 250 WO HCPCS: Performed by: NURSE ANESTHETIST, CERTIFIED REGISTERED

## 2021-06-09 PROCEDURE — 2580000003 HC RX 258: Performed by: OBSTETRICS & GYNECOLOGY

## 2021-06-09 PROCEDURE — 80307 DRUG TEST PRSMV CHEM ANLYZR: CPT

## 2021-06-09 PROCEDURE — 0HQ9XZZ REPAIR PERINEUM SKIN, EXTERNAL APPROACH: ICD-10-PCS | Performed by: OBSTETRICS & GYNECOLOGY

## 2021-06-09 PROCEDURE — 0UBGXZZ EXCISION OF VAGINA, EXTERNAL APPROACH: ICD-10-PCS | Performed by: OBSTETRICS & GYNECOLOGY

## 2021-06-09 PROCEDURE — 86900 BLOOD TYPING SEROLOGIC ABO: CPT

## 2021-06-09 PROCEDURE — 1220000000 HC SEMI PRIVATE OB R&B

## 2021-06-09 PROCEDURE — 86901 BLOOD TYPING SEROLOGIC RH(D): CPT

## 2021-06-09 PROCEDURE — 7200000001 HC VAGINAL DELIVERY

## 2021-06-09 PROCEDURE — 51701 INSERT BLADDER CATHETER: CPT

## 2021-06-09 PROCEDURE — 6360000002 HC RX W HCPCS: Performed by: OBSTETRICS & GYNECOLOGY

## 2021-06-09 PROCEDURE — 3700000025 EPIDURAL BLOCK: Performed by: ANESTHESIOLOGY

## 2021-06-09 PROCEDURE — 59400 OBSTETRICAL CARE: CPT | Performed by: OBSTETRICS & GYNECOLOGY

## 2021-06-09 PROCEDURE — 88304 TISSUE EXAM BY PATHOLOGIST: CPT

## 2021-06-09 PROCEDURE — 86780 TREPONEMA PALLIDUM: CPT

## 2021-06-09 PROCEDURE — 6370000000 HC RX 637 (ALT 250 FOR IP): Performed by: OBSTETRICS & GYNECOLOGY

## 2021-06-09 PROCEDURE — 3E033VJ INTRODUCTION OF OTHER HORMONE INTO PERIPHERAL VEIN, PERCUTANEOUS APPROACH: ICD-10-PCS | Performed by: OBSTETRICS & GYNECOLOGY

## 2021-06-09 RX ORDER — BUPIVACAINE HYDROCHLORIDE 5 MG/ML
INJECTION, SOLUTION EPIDURAL; INTRACAUDAL PRN
Status: DISCONTINUED | OUTPATIENT
Start: 2021-06-09 | End: 2021-06-09 | Stop reason: SDUPTHER

## 2021-06-09 RX ORDER — IBUPROFEN 800 MG/1
800 TABLET ORAL EVERY 8 HOURS
Status: DISCONTINUED | OUTPATIENT
Start: 2021-06-09 | End: 2021-06-10 | Stop reason: HOSPADM

## 2021-06-09 RX ORDER — DOCUSATE SODIUM 100 MG/1
100 CAPSULE, LIQUID FILLED ORAL 2 TIMES DAILY
Status: DISCONTINUED | OUTPATIENT
Start: 2021-06-09 | End: 2021-06-09

## 2021-06-09 RX ORDER — SODIUM CHLORIDE, SODIUM LACTATE, POTASSIUM CHLORIDE, AND CALCIUM CHLORIDE .6; .31; .03; .02 G/100ML; G/100ML; G/100ML; G/100ML
1000 INJECTION, SOLUTION INTRAVENOUS PRN
Status: DISCONTINUED | OUTPATIENT
Start: 2021-06-09 | End: 2021-06-09

## 2021-06-09 RX ORDER — HYDROCODONE BITARTRATE AND ACETAMINOPHEN 5; 325 MG/1; MG/1
1 TABLET ORAL EVERY 6 HOURS PRN
Status: DISCONTINUED | OUTPATIENT
Start: 2021-06-09 | End: 2021-06-10 | Stop reason: HOSPADM

## 2021-06-09 RX ORDER — SODIUM CHLORIDE 9 MG/ML
25 INJECTION, SOLUTION INTRAVENOUS PRN
Status: DISCONTINUED | OUTPATIENT
Start: 2021-06-09 | End: 2021-06-10 | Stop reason: HOSPADM

## 2021-06-09 RX ORDER — SODIUM CHLORIDE 0.9 % (FLUSH) 0.9 %
5-40 SYRINGE (ML) INJECTION EVERY 12 HOURS SCHEDULED
Status: DISCONTINUED | OUTPATIENT
Start: 2021-06-09 | End: 2021-06-10 | Stop reason: HOSPADM

## 2021-06-09 RX ORDER — HYDROCODONE BITARTRATE AND ACETAMINOPHEN 5; 325 MG/1; MG/1
2 TABLET ORAL EVERY 6 HOURS PRN
Status: DISCONTINUED | OUTPATIENT
Start: 2021-06-09 | End: 2021-06-10 | Stop reason: HOSPADM

## 2021-06-09 RX ORDER — SODIUM CHLORIDE, SODIUM LACTATE, POTASSIUM CHLORIDE, AND CALCIUM CHLORIDE .6; .31; .03; .02 G/100ML; G/100ML; G/100ML; G/100ML
500 INJECTION, SOLUTION INTRAVENOUS PRN
Status: DISCONTINUED | OUTPATIENT
Start: 2021-06-09 | End: 2021-06-09

## 2021-06-09 RX ORDER — DIPHENHYDRAMINE HYDROCHLORIDE 50 MG/ML
25 INJECTION INTRAMUSCULAR; INTRAVENOUS EVERY 4 HOURS PRN
Status: DISCONTINUED | OUTPATIENT
Start: 2021-06-09 | End: 2021-06-09

## 2021-06-09 RX ORDER — LANOLIN 100 %
OINTMENT (GRAM) TOPICAL PRN
Status: DISCONTINUED | OUTPATIENT
Start: 2021-06-09 | End: 2021-06-10 | Stop reason: HOSPADM

## 2021-06-09 RX ORDER — DOCUSATE SODIUM 100 MG/1
100 CAPSULE, LIQUID FILLED ORAL 2 TIMES DAILY
Status: DISCONTINUED | OUTPATIENT
Start: 2021-06-09 | End: 2021-06-10 | Stop reason: HOSPADM

## 2021-06-09 RX ORDER — SODIUM CHLORIDE 0.9 % (FLUSH) 0.9 %
5-40 SYRINGE (ML) INJECTION EVERY 12 HOURS SCHEDULED
Status: DISCONTINUED | OUTPATIENT
Start: 2021-06-09 | End: 2021-06-09

## 2021-06-09 RX ORDER — SODIUM CHLORIDE, SODIUM LACTATE, POTASSIUM CHLORIDE, CALCIUM CHLORIDE 600; 310; 30; 20 MG/100ML; MG/100ML; MG/100ML; MG/100ML
INJECTION, SOLUTION INTRAVENOUS CONTINUOUS
Status: DISCONTINUED | OUTPATIENT
Start: 2021-06-09 | End: 2021-06-09

## 2021-06-09 RX ORDER — SODIUM CHLORIDE 9 MG/ML
25 INJECTION, SOLUTION INTRAVENOUS PRN
Status: DISCONTINUED | OUTPATIENT
Start: 2021-06-09 | End: 2021-06-09

## 2021-06-09 RX ORDER — FAMOTIDINE 20 MG/1
20 TABLET, FILM COATED ORAL 2 TIMES DAILY PRN
Status: DISCONTINUED | OUTPATIENT
Start: 2021-06-09 | End: 2021-06-10 | Stop reason: HOSPADM

## 2021-06-09 RX ORDER — ACETAMINOPHEN 325 MG/1
650 TABLET ORAL EVERY 4 HOURS PRN
Status: DISCONTINUED | OUTPATIENT
Start: 2021-06-09 | End: 2021-06-10 | Stop reason: HOSPADM

## 2021-06-09 RX ORDER — ONDANSETRON 2 MG/ML
4 INJECTION INTRAMUSCULAR; INTRAVENOUS EVERY 6 HOURS PRN
Status: DISCONTINUED | OUTPATIENT
Start: 2021-06-09 | End: 2021-06-10 | Stop reason: HOSPADM

## 2021-06-09 RX ORDER — SIMETHICONE 80 MG
80 TABLET,CHEWABLE ORAL EVERY 6 HOURS PRN
Status: DISCONTINUED | OUTPATIENT
Start: 2021-06-09 | End: 2021-06-10 | Stop reason: HOSPADM

## 2021-06-09 RX ORDER — ACETAMINOPHEN 325 MG/1
650 TABLET ORAL EVERY 4 HOURS PRN
Status: DISCONTINUED | OUTPATIENT
Start: 2021-06-09 | End: 2021-06-09

## 2021-06-09 RX ORDER — FERROUS SULFATE 325(65) MG
325 TABLET ORAL
Status: DISCONTINUED | OUTPATIENT
Start: 2021-06-10 | End: 2021-06-10 | Stop reason: HOSPADM

## 2021-06-09 RX ORDER — SODIUM CHLORIDE 0.9 % (FLUSH) 0.9 %
5-40 SYRINGE (ML) INJECTION PRN
Status: DISCONTINUED | OUTPATIENT
Start: 2021-06-09 | End: 2021-06-09

## 2021-06-09 RX ORDER — SODIUM CHLORIDE 0.9 % (FLUSH) 0.9 %
5-40 SYRINGE (ML) INJECTION PRN
Status: DISCONTINUED | OUTPATIENT
Start: 2021-06-09 | End: 2021-06-10 | Stop reason: HOSPADM

## 2021-06-09 RX ORDER — SODIUM CHLORIDE, SODIUM LACTATE, POTASSIUM CHLORIDE, CALCIUM CHLORIDE 600; 310; 30; 20 MG/100ML; MG/100ML; MG/100ML; MG/100ML
INJECTION, SOLUTION INTRAVENOUS CONTINUOUS
Status: DISCONTINUED | OUTPATIENT
Start: 2021-06-09 | End: 2021-06-10 | Stop reason: HOSPADM

## 2021-06-09 RX ORDER — ONDANSETRON 2 MG/ML
4 INJECTION INTRAMUSCULAR; INTRAVENOUS EVERY 6 HOURS PRN
Status: DISCONTINUED | OUTPATIENT
Start: 2021-06-09 | End: 2021-06-09

## 2021-06-09 RX ORDER — BUPIVACAINE HYDROCHLORIDE 2.5 MG/ML
INJECTION, SOLUTION EPIDURAL; INFILTRATION; INTRACAUDAL PRN
Status: DISCONTINUED | OUTPATIENT
Start: 2021-06-09 | End: 2021-06-09 | Stop reason: SDUPTHER

## 2021-06-09 RX ADMIN — SODIUM CHLORIDE, POTASSIUM CHLORIDE, SODIUM LACTATE AND CALCIUM CHLORIDE: 600; 310; 30; 20 INJECTION, SOLUTION INTRAVENOUS at 18:56

## 2021-06-09 RX ADMIN — SODIUM CHLORIDE, POTASSIUM CHLORIDE, SODIUM LACTATE AND CALCIUM CHLORIDE: 600; 310; 30; 20 INJECTION, SOLUTION INTRAVENOUS at 07:40

## 2021-06-09 RX ADMIN — SODIUM CHLORIDE, POTASSIUM CHLORIDE, SODIUM LACTATE AND CALCIUM CHLORIDE: 600; 310; 30; 20 INJECTION, SOLUTION INTRAVENOUS at 13:30

## 2021-06-09 RX ADMIN — Medication 1 MILLI-UNITS/MIN: at 08:37

## 2021-06-09 RX ADMIN — BUPIVACAINE HYDROCHLORIDE 6 ML: 2.5 INJECTION, SOLUTION EPIDURAL; INFILTRATION; INTRACAUDAL; PERINEURAL at 14:22

## 2021-06-09 RX ADMIN — Medication 166.7 ML: at 18:43

## 2021-06-09 RX ADMIN — Medication 15 ML/HR: at 14:24

## 2021-06-09 RX ADMIN — BUPIVACAINE HYDROCHLORIDE 8 ML: 5 INJECTION, SOLUTION EPIDURAL; INTRACAUDAL; PERINEURAL at 18:22

## 2021-06-09 RX ADMIN — BENZOCAINE AND LEVOMENTHOL: 200; 5 SPRAY TOPICAL at 23:00

## 2021-06-09 RX ADMIN — WITCH HAZEL 40 EACH: 500 SOLUTION RECTAL; TOPICAL at 23:00

## 2021-06-09 RX ADMIN — Medication 87.3 MILLI-UNITS/MIN: at 18:56

## 2021-06-09 RX ADMIN — IBUPROFEN 800 MG: 800 TABLET, FILM COATED ORAL at 21:10

## 2021-06-09 ASSESSMENT — PAIN SCALES - GENERAL: PAINLEVEL_OUTOF10: 0

## 2021-06-09 NOTE — ANESTHESIA PROCEDURE NOTES
Epidural Block    Patient location during procedure: OB  Start time: 6/9/2021 2:09 PM  End time: 6/9/2021 2:28 PM  Reason for block: labor epidural  Staffing  Performed: resident/CRNA   Anesthesiologist: Grupo Hook MD  Resident/CRNA: PADMAJA Wyatt CRNA  Preanesthetic Checklist  Completed: patient identified, IV checked, risks and benefits discussed, monitors and equipment checked, pre-op evaluation, timeout performed, anesthesia consent given, oxygen available and patient being monitored  Epidural  Patient position: sitting  Prep: ChloraPrep and site prepped and draped  Patient monitoring: cardiac monitor, continuous pulse ox and frequent blood pressure checks  Approach: midline  Location: lumbar (1-5)  Injection technique: CYNTHIA air  Provider prep: mask and sterile gloves  Needle  Needle type: Tuohy   Needle gauge: 17 G  Needle length: 3.5 in  Needle insertion depth: 5 cm  Catheter type: side hole  Catheter size: 19 G (20 G)  Catheter at skin depth: 12 cm  Test dose: negative (3ml 1.5% lido with epi)  Assessment  Sensory level: T8  Hemodynamics: stable  Attempts: 1  Additional Notes  DPE:  25 gauge pencil point needle through touhy X1. Positive CSF.

## 2021-06-09 NOTE — H&P
Department of Obstetrics and Gynecology  Attending Obstetrics History and Physical        CHIEF COMPLAINT:  Elective induction    HISTORY OF PRESENT ILLNESS:      The patient is a 29 y.o.  2 parity 26 female at 44 weeks 1 day gestation with [facility] [place] 6/15/2021 who presents for induction of labor secondary to [place] at term and HSV. Patient denies vaginal bleeding, loss of fluid, regular contractions, and decreased fetal movement. Pregnancy has been complicated by uterine fibroid, elevated 1 hour GTT, HSV and  AMA status. Patient presents with a chief complaint as above and is being admitted for induction    DATES:    Last Menstrual Period:  2020  Estimated Due Date:  6/15/2021    PRENATAL CARE:    Provider:  ISIDRO Falcon D.O., [facility]    Blood Type/Rh:  O positive  Antibody Screen:  negative  Rubella:  immune  RPR:  Non-reactive  Hepatitis B Surface Antigen: non-reactive  HIV:  Non-reactive  Gonorrhea:  negative  Chlamydia:  negative  MSAFP/Multiple Markers:  Date:  ; Results:    U/S Structural Survery:  See report  1 hour Glucose Tolerance Test:  133  Group B Strep:  negative  3 Hour Glucose Tolerance Test:  61/133/124/99    PAST OB HISTORY        Depression:  No      Post-partum depression:  No      Diabetes:  No      Gestational Diabetes:  No      Thyroid Disease:  No      Chronic HTN:  No      Gestation HTN:  No      Pre-eclampsia:  No      Seizure disorder:  No      Asthma:  No      Clotting disorder:  No      :  No      Tubal ligation:  No      D & C:  No      Cerclage:  No      LEEP:  No      Myomectomy:  No    OB History    Para Term  AB Living   2 2 2     2   SAB TAB Ectopic Molar Multiple Live Births           0 2      # Outcome Date GA Lbr Sincere/2nd Weight Sex Delivery Anes PTL Lv   2 Term 21 39w1d 00:21 / 00:03 7 lb 8 oz (3.402 kg) F Vag-Spont EPI N BERRY      Birth Comments: lee; HUGS: 605 ; Bands: [id number]   1 Term 07/10/18    M Vag-Spont EPI  BERRY Oral   Weight:       Height:         General appearance:  awake, alert, cooperative, no apparent distress, and appears stated age  Neurologic:  Mental Status Exam:  Level of Alertness:   awake  Orientation:   person, place, time  Memory:   normal  Fund of Knowledge:  normal  Attention/Concentration:  normal  Language:  normal  Lungs:  No increased work of breathing, good air exchange, clear to auscultation bilaterally, no crackles or wheezing  Heart:  normal S1 and S2  Abdomen:  soft, non-distended and non-tender  Fetal heart rate:  Baseline Heart Rate 1, accelerations:  present  long term variability:  moderate  decelerations:  absent  Pelvis:  External Genitalia: General appearance; normal, Hair distribution; normal, Lesions absent  Cervix:    DILATION:  2 cm  EFFACEMENT:   50%  STATION:  -2 cm    Contraction frequency:  irregular  Membranes:  Intact    Pelvic Ultrasound:      General Labs:      ASSESSMENT AND PLAN:    The patient is a 29 y.o.  2 parity 1001 at 44 weeks 1 day gestation  AMA  HSV    Plan:   Admission   Pitocin induction       Vidhya Falcon D.O.

## 2021-06-09 NOTE — L&D DELIVERY SUMMARY NOTE
Department of Obstetrics and Gynecology  Spontaneous Vaginal Delivery Note      Pre-operative Diagnosis:  Term pregnancy, induced labor, single fetus and pregnancy complicated by: AMA, uterine fibroid, elevated 1 hour GTT    Post-operative Diagnosis:  Living  infant(s) and Female, vaginal cyst, nuchal cord    Information for the patient's :  Katherine Barnes [[id number]]                    Infant Wt:   Information for the patient's :  Katherine Barnes [[id number]]           APGARS:     Information for the patient's :  Katherine Barnes [[id number]]           Anesthesia:  epidural anesthesia    Application and Delivery:  . 30 y/o  female at 44 weeks gestation presented to L&D for induction of labor. Pitocin was administered on arrival and adequate contraction pattern achieved. Amniotomy was performed after several hours of pitocin. Epidural was administered. Patient progressed readily throughout the afternoon and evening. Upon reaching complete dilatation and effacement, patient pushed effectively for less than 2 minutes and delivered a viable female  over an intact perineum from a HENNA presentation. A single nuchal cord was reduced on the perineum. The shoulders and body were delivered immediately after the head and  was placed on maternal abdomen for suction ing and drying. After brief delay, cord was clamped and cut. A segment of cord was collected and later discarded due to fetal wellbeing. Cord blood was collected. Placenta delivered spontaneously intact with 3 vessel cord. The lower uterine segment was cleared of all clots and debris. The Uterus was found to be firm and hemostasis assured. No cervical, vaginal or labial lacerations were noted. A first degree perineal laceration was repaired in the usual fashion.   During the repair a vaginal cyst located mid distal posterior vaginal wall was excised in total.  Hemostasis was assured. Rectum was found to be intact. Sponge, lap and needle counts were correct x 2. Patient tolerated the procedure well and was left to recover in L&D room in stable condition. Delivery Summary:  Labor & Delivery Summary  Dilation Complete Date: 06/09/21  Dilation Complete Time: 1833    Specimen:  Placenta sent to pathology for hold specimen, vaginal cyst     Intake/Output:     EBL: 300 cc    Condition:  infant stable and mother stable    Blood Type and Rh: O POS        Rubella Immunity Status:   Immune           Infant Feeding:    breast    Attending Attestation: I performed the procedure.

## 2021-06-09 NOTE — ANESTHESIA PRE PROCEDURE
Department of Anesthesiology  Preprocedure Note       Name:  Dorothea Parra   Age:  29 y.o.  :  1985                                          MRN:  [id number]         Date:  2021      Surgeon: * No surgeons listed *    Procedure: * No procedures listed *    Medications prior to admission:   Prior to Admission medications    Medication Sig Start Date End Date Taking?  Authorizing Provider   valACYclovir (VALTREX) 500 MG tablet Take 1 tablet by mouth daily 21  Yes Susie Severe Federer, DO   Prenatal Vit-Fe Fumarate-FA (PRENATAL 1+1 PO) Take by mouth   Yes Historical Provider, MD       Current medications:    Current Facility-Administered Medications   Medication Dose Route Frequency Provider Last Rate Last Admin    lactated ringers infusion   Intravenous Continuous Susie Severe Federer,  mL/hr at 21 0740 New Bag at 21 0740    lactated ringers bolus  500 mL Intravenous PRN Susie Severe Federer, DO        Or    lactated ringers bolus  1,000 mL Intravenous PRN Susie Severe Federer, DO        sodium chloride flush 0.9 % injection 5-40 mL  5-40 mL Intravenous 2 times per day Susie Severe Federer, DO        sodium chloride flush 0.9 % injection 5-40 mL  5-40 mL Intravenous PRN Susie Severe Federer, DO        0.9 % sodium chloride infusion  25 mL Intravenous PRN Susie Severe Federer, DO        acetaminophen (TYLENOL) tablet 650 mg  650 mg Oral Q4H PRN Susie Severe Federer, DO        diphenhydrAMINE (BENADRYL) injection 25 mg  25 mg Intravenous Q4H PRN Susie Severe Federer, DO        docusate sodium (COLACE) capsule 100 mg  100 mg Oral BID Susie Severe Federer, DO        ondansetron [facility]) injection 4 mg  4 mg Intravenous Q6H PRN Susie Severe Federer, DO        benzocaine-menthol (DERMOPLAST) 20-0.5 % spray   Topical PRN Susie Severe Federer, DO        oxytocin (PITOCIN) 30 units in 500 mL infusion  1-20 stephanie-units/min Intravenous Continuous Susie Severe Federer, DO 2 mL/hr at Date of last solid food consumption: 06/09/21    BMI:   Wt Readings from Last 3 Encounters:   06/09/21 171 lb (77.6 kg)   06/07/21 172 lb (78 kg)   06/02/21 173 lb 9.6 oz (78.7 kg)     Body mass index is 29.35 kg/m². CBC:   Lab Results   Component Value Date    WBC 8.6 06/09/2021    RBC 3.99 06/09/2021    HGB 12.1 06/09/2021    HCT 35.9 06/09/2021    MCV 90.1 06/09/2021    RDW 13.5 06/09/2021     06/09/2021       CMP: No results found for: NA, K, CL, CO2, BUN, CREATININE, GFRAA, AGRATIO, LABGLOM, GLUCOSE, PROT, CALCIUM, BILITOT, ALKPHOS, AST, ALT    POC Tests: No results for input(s): POCGLU, POCNA, POCK, POCCL, POCBUN, POCHEMO, POCHCT in the last 72 hours. Coags: No results found for: PROTIME, INR, APTT    HCG (If Applicable): No results found for: PREGTESTUR, PREGSERUM, HCG, HCGQUANT     ABGs: No results found for: PHART, PO2ART, ZLA3XOR, OYN7RZI, BEART, N3DCHKGG     Type & Screen (If Applicable):  No results found for: LABABO, LABRH    Drug/Infectious Status (If Applicable):  No results found for: HIV, HEPCAB    COVID-19 Screening (If Applicable): No results found for: COVID19        Anesthesia Evaluation  Patient summary reviewed and Nursing notes reviewed no history of anesthetic complications:   Airway: Mallampati: II        Dental:          Pulmonary:Negative Pulmonary ROS                              Cardiovascular:Negative CV ROS                      Neuro/Psych:   Negative Neuro/Psych ROS              GI/Hepatic/Renal: Neg GI/Hepatic/Renal ROS            Endo/Other: Negative Endo/Other ROS                    Abdominal:           Vascular: negative vascular ROS. Anesthesia Plan      epidural     ASA 2     (Benefits and risks of ETIENNE were discussed and agreed upon. All questions were answered, and verbal consent was obtained.)        Anesthetic plan and risks discussed with patient.                       PADMAJA Fuentes - CRNA   6/9/2021

## 2021-06-10 VITALS
WEIGHT: 171 LBS | RESPIRATION RATE: 16 BRPM | DIASTOLIC BLOOD PRESSURE: 79 MMHG | SYSTOLIC BLOOD PRESSURE: 120 MMHG | BODY MASS INDEX: 29.19 KG/M2 | HEART RATE: 86 BPM | TEMPERATURE: 98 F | HEIGHT: 64 IN

## 2021-06-10 LAB
BASOPHILS ABSOLUTE: 0 K/UL (ref 0–0.2)
BASOPHILS RELATIVE PERCENT: 0.4 %
EOSINOPHILS ABSOLUTE: 0.1 K/UL (ref 0–0.6)
EOSINOPHILS RELATIVE PERCENT: 0.8 %
HCT VFR BLD CALC: 31.7 % (ref 36–48)
HEMOGLOBIN: 10.8 G/DL (ref 12–16)
LYMPHOCYTES ABSOLUTE: 1.5 K/UL (ref 1–5.1)
LYMPHOCYTES RELATIVE PERCENT: 13.8 %
MCH RBC QN AUTO: 30.5 PG (ref 26–34)
MCHC RBC AUTO-ENTMCNC: 34.1 G/DL (ref 31–36)
MCV RBC AUTO: 89.6 FL (ref 80–100)
MONOCYTES ABSOLUTE: 0.8 K/UL (ref 0–1.3)
MONOCYTES RELATIVE PERCENT: 6.8 %
NEUTROPHILS ABSOLUTE: 8.6 K/UL (ref 1.7–7.7)
NEUTROPHILS RELATIVE PERCENT: 78.2 %
PDW BLD-RTO: 13.7 % (ref 12.4–15.4)
PLATELET # BLD: 161 K/UL (ref 135–450)
PMV BLD AUTO: 9.7 FL (ref 5–10.5)
RBC # BLD: 3.54 M/UL (ref 4–5.2)
TOTAL SYPHILLIS IGG/IGM: NORMAL
WBC # BLD: 11 K/UL (ref 4–11)

## 2021-06-10 PROCEDURE — 36415 COLL VENOUS BLD VENIPUNCTURE: CPT

## 2021-06-10 PROCEDURE — 6370000000 HC RX 637 (ALT 250 FOR IP): Performed by: OBSTETRICS & GYNECOLOGY

## 2021-06-10 PROCEDURE — 85025 COMPLETE CBC W/AUTO DIFF WBC: CPT

## 2021-06-10 RX ORDER — HYDROCODONE BITARTRATE AND ACETAMINOPHEN 5; 325 MG/1; MG/1
1 TABLET ORAL EVERY 6 HOURS PRN
Qty: 10 TABLET | Refills: 0 | Status: SHIPPED | OUTPATIENT
Start: 2021-06-10 | End: 2021-06-13

## 2021-06-10 RX ORDER — IBUPROFEN 800 MG/1
800 TABLET ORAL EVERY 8 HOURS
Qty: 40 TABLET | Refills: 0 | Status: SHIPPED | OUTPATIENT
Start: 2021-06-10

## 2021-06-10 RX ADMIN — IBUPROFEN 800 MG: 800 TABLET, FILM COATED ORAL at 06:10

## 2021-06-10 RX ADMIN — IBUPROFEN 800 MG: 800 TABLET, FILM COATED ORAL at 16:10

## 2021-06-10 ASSESSMENT — PAIN SCALES - GENERAL
PAINLEVEL_OUTOF10: 4
PAINLEVEL_OUTOF10: 2

## 2021-06-10 NOTE — PLAN OF CARE
Problem: Anxiety:  Goal: Level of anxiety will decrease  Description: Level of anxiety will decrease  Outcome: Completed     Problem: Breathing Pattern - Ineffective:  Goal: Able to breathe comfortably  Description: Able to breathe comfortably  Outcome: Completed     Problem: Fluid Volume - Imbalance:  Goal: Absence of imbalanced fluid volume signs and symptoms  Description: Absence of imbalanced fluid volume signs and symptoms  Outcome: Completed  Goal: Absence of intrapartum hemorrhage signs and symptoms  Description: Absence of intrapartum hemorrhage signs and symptoms  Outcome: Completed     Problem: Infection - Intrapartum Infection:  Goal: Will show no infection signs and symptoms  Description: Will show no infection signs and symptoms  Outcome: Completed     Problem: Labor Process - Prolonged:  Goal: Labor progression, first stage, within specified pattern  Description: Labor progression, first stage, within specified pattern  Outcome: Completed  Goal: Labor progession, second stage, within specified pattern  Description: Labor progession, second stage, within specified pattern  Outcome: Completed  Goal: Uterine contractions within specified parameters  Description: Uterine contractions within specified parameters  Outcome: Completed     Problem:  Screening:  Goal: Ability to make informed decisions regarding treatment has improved  Description: Ability to make informed decisions regarding treatment has improved  Outcome: Completed     Problem: Pain - Acute:  Goal: Pain level will decrease  Description: Pain level will decrease  Outcome: Completed  Goal: Able to cope with pain  Description: Able to cope with pain  Outcome: Completed     Problem: Urinary Retention:  Goal: Experiences of bladder distention will decrease  Description: Experiences of bladder distention will decrease  Outcome: Completed  Goal: Urinary elimination within specified parameters  Description: Urinary elimination within specified parameters  Outcome: Completed     Problem: Pain:  Goal: Pain level will decrease  Description: Pain level will decrease  Outcome: Completed  Goal: Control of acute pain  Description: Control of acute pain  Outcome: Completed  Goal: Control of chronic pain  Description: Control of chronic pain  Outcome: Completed     Problem: VAGINAL DELIVERY - RECOVERY AND POST PARTUM  Goal: Vital signs are medically acceptable  Outcome: Ongoing  Goal: Patient will remain free of falls  Outcome: Ongoing  Goal: Fundus firm at midline  Outcome: Ongoing  Goal: Moderate rubra without clots, no purulent discharge, no foul smelling lochia  Outcome: Ongoing  Goal: Empties bladder  Outcome: Ongoing  Goal: Verbalizes understanding of normal bowel function resumption  Outcome: Ongoing  Goal: Edema will be absent or minimal  Outcome: Ongoing  Goal: Breasts are soft with nipple integrity intact  Outcome: Ongoing  Goal: Demonstrates appropriate breast feeding techniques  Outcome: Ongoing  Goal: Appropriate behavior observed  Outcome: Ongoing  Goal: Positive Mother-Baby interactions are observed  Outcome: Ongoing  Goal: Perineum intact without discharge or hematoma  Outcome: Ongoing  Goal: Ambulates independently  Outcome: Ongoing     Problem: PAIN  Goal: Patient's pain/discomfort is manageable  Outcome: Ongoing     Problem: KNOWLEDGE DEFICIT  Goal: Patient/S.O. demonstrates understanding of disease process, treatment plan, medications, and discharge instructions.   Outcome: Ongoing

## 2021-06-10 NOTE — LACTATION NOTE
This note was copied from a baby's chart. Breastfeeding education initiated. Introduced self to patient as Lactation RN, name and phone number written on white board in room. Mother did breastfeed her first child for 1 year. Mother already had infant latched to the left breast in football hold, emphasized the importance of positioning and obtaining a deep latch. Infant observed with TREVOR, SRS and AS. Reviewed with mother what to expect over the next  24-48 hours with infant feedings, infant output, and breast care. Educated mother on how to hand express colostrum. Reviewed infant feeding cues and encouraged mother to allow infant to breast feed on demand, a minimum of 8-12 times a day after the first day of life. Also encouraged mother to avoid giving infant a pacifier or bottle for at least the first two weeks of life. Binder and breast feeding log reviewed, all questions answered. Initial breastfeeding handouts given. Mother instructed to call Lactation nurse for F/U care as needed.

## 2021-06-10 NOTE — PLAN OF CARE
Problem: VAGINAL DELIVERY - RECOVERY AND POST PARTUM  Goal: Vital signs are medically acceptable  6/10/2021 0726 by Soraya Sims RN  Outcome: Ongoing  6/9/2021 2134 by Shobha Pablo RN  Outcome: Ongoing  Goal: Patient will remain free of falls  6/10/2021 0726 by Soraya Sims RN  Outcome: Ongoing  6/9/2021 2134 by Shobha Pablo RN  Outcome: Ongoing  Goal: Fundus firm at midline  6/10/2021 0726 by Soraya Sims RN  Outcome: Ongoing  6/9/2021 2134 by Shobha Pablo RN  Outcome: Ongoing  Goal: Moderate rubra without clots, no purulent discharge, no foul smelling lochia  6/10/2021 0726 by Soraya Sims RN  Outcome: Ongoing  6/9/2021 2134 by Shobha Pablo RN  Outcome: Ongoing  Goal: Empties bladder  6/10/2021 0726 by Soraya Sims RN  Outcome: Ongoing  6/9/2021 2134 by Shobha Pablo RN  Outcome: Ongoing  Goal: Verbalizes understanding of normal bowel function resumption  6/10/2021 0726 by Soraya Sims RN  Outcome: Ongoing  6/9/2021 2134 by Shobha Pablo RN  Outcome: Ongoing  Goal: Edema will be absent or minimal  6/10/2021 0726 by Soraya Sims RN  Outcome: Ongoing  6/9/2021 2134 by Shobha Pablo RN  Outcome: Ongoing  Goal: Breasts are soft with nipple integrity intact  6/10/2021 0726 by Soraya Sims RN  Outcome: Ongoing  6/9/2021 2134 by Shobha Pablo RN  Outcome: Ongoing  Goal: Demonstrates appropriate breast feeding techniques  6/10/2021 0726 by Soraya Sims RN  Outcome: Ongoing  6/9/2021 2134 by Shobha Pablo RN  Outcome: Ongoing  Goal: Appropriate behavior observed  6/10/2021 0726 by Soraya Sims RN  Outcome: Ongoing  6/9/2021 2134 by Shobha Pablo RN  Outcome: Ongoing  Goal: Positive Mother-Baby interactions are observed  6/10/2021 0726 by Soraya Sims RN  Outcome: Ongoing  6/9/2021 2134 by Hershal Pablo, RN  Outcome: Ongoing  Goal: Perineum intact without discharge or hematoma  6/10/2021 0726 by Soraya Sims RN  Outcome: Ongoing  6/9/2021 2134 by Shobha Pablo RN  Outcome: Ongoing  Goal: Ambulates independently  6/10/2021 0726 by Hoda Longoria RN  Outcome: Ongoing  6/9/2021 2134 by Amado Moran RN  Outcome: Ongoing     Problem: PAIN  Goal: Patient's pain/discomfort is manageable  6/10/2021 0726 by Hoda Longoria RN  Outcome: Ongoing  6/9/2021 2134 by Amado Moran RN  Outcome: Ongoing     Problem: KNOWLEDGE DEFICIT  Goal: Patient/S.O. demonstrates understanding of disease process, treatment plan, medications, and discharge instructions.   6/10/2021 0726 by Hoda Longoria RN  Outcome: Ongoing  6/9/2021 2134 by Amado Moran RN  Outcome: Ongoing

## 2021-06-10 NOTE — ANESTHESIA POSTPROCEDURE EVALUATION
Department of Anesthesiology  Postprocedure Note    Patient: Jerrica Lopez  MRN: [id number]  YOB: 1985  Date of evaluation: 6/10/2021  Time:  7:19 AM     Procedure Summary     Date: 06/09/21 Room / Location:     Anesthesia Start: 1409 Anesthesia Stop: 1836    Procedure: Labor Analgesia Diagnosis:     Scheduled Providers:  Responsible Provider: Irving Crowley MD    Anesthesia Type: epidural ASA Status: 2          Anesthesia Type: epidural    Julius Phase I: Julius Score: 10    Julius Phase II: Julius Score: 9    Last vitals: Reviewed and per EMR flowsheets. Anesthesia Post Evaluation    Level of consciousness: awake  Complications: no  Cardiovascular status: hemodynamically stable  Respiratory status: acceptable  Comments: No apparent complications from neuraxial anesthesia.

## 2021-06-10 NOTE — DISCHARGE SUMMARY
DISCHARGE SUMMARY    Primary Diagnosis: intrauterine pregnancy at 44 weeks gestation  Secondary Diagnosis: AMA, elevated glucose tolerance test, uterine fibroid  Procedures: , excision of vaginal cyst  Referrals: none  Significant Findings: viable female , vaginal cyst  Reason for Hospitalization: induction of labor  Complications: none        Discharge Instructions:    Diet:common adult  Activity: activity as tolerated, no heavy lifting or driving for 2 weeks, pelvic rest x 6 weeks  Medications: ibuprofen  Disposition: Home  Condition on discharge: Stable  Follow up: in 2 week(s)

## 2021-06-11 NOTE — PROGRESS NOTES
Department of Obstetrics and Gynecology  Labor and Delivery  Attending Post Partum Progress Note      SUBJECTIVE:  28 y/o  female S/P uncomplicated Vaginal Delivery PPD #1. The pregnancy was complicated by AMA, elevated 1 hour GTT, and HSV. No current complaints are noted including headache, change in vision, fever, chills, chest pain, shortness of breath, nausea, vomiting, diarrhea or constipation. The patient denies any urinary complaints or calf tenderness. Lochia is described as moderate. The patient plans to breastfeed.     OBJECTIVE:      Vitals:  /75   Pulse 92   Temp 98.1 °F (36.7 °C) (Oral)   Resp 16   Ht 5' 4\" (1.626 m)   Wt 171 lb (77.6 kg)   LMP 2020 (Exact Date)   Breastfeeding Unknown   BMI 29.35 kg/m²     CONSTITUTIONAL:  awake, alert, cooperative, no apparent distress, and appears stated age  LUNGS:  No increased work of breathing, good air exchange, clear to auscultation bilaterally, no crackles or wheezing  CARDIOVASCULAR:  normal S1 and S2  ABDOMEN:  soft, non-distended and non-tender  MUSCULOSKELETAL:  full range of motion noted  NEUROLOGIC:  Mental Status Exam:  Level of Alertness:   awake  Orientation:   person, place, time  Memory:   normal  Fund of Knowledge:  normal  Attention/Concentration:  normal  Language:  normal  SKIN:  normal skin color, texture, turgor    DATA:    CBC:    Lab Results   Component Value Date    WBC 11.0 06/10/2021    RBC 3.54 06/10/2021    HGB 10.8 06/10/2021    HCT 31.7 06/10/2021    MCV 89.6 06/10/2021    RDW 13.7 06/10/2021     06/10/2021       ASSESSMENT & PLAN:    Impression:  S/P Vaginal Delivery  AMA          Plan:   See orders and Patient Instructions  Possible home tonight or in AM
Dr. Pippa Penn at bedside for patient evaluation. SVE performed at 1257, noted to be 2/50/-2. AROM for clear fluid at 1300. Patient tolerated well. Will continue to monitor.
Late entry from 6/9/2021    Patient seen and examined. No current complaints  SVE: 2/50/-2 vertex. Amniotomy performed for moderate amount of clear fluid. Decrease pitocin  Expectant management.
Living female infant delivered vaginally at this time
Patient admitted to room 383. Patient oriented to room, call light, bed rails, phone, lights and bathroom. Bed locked, in lowest position, side rails up 2/4, call light within reach. Dr. Lena Perales notified of patient arrival; MD to enter orders. Will continue to monitor.
Pt actively pushing.  RN and DO remain in continuous attendance at the bedside assessing fetal heart rate per EFM
SARAH Campbell RN, PAT Morel DO, and ISIDRO Falcon DO notified of recent SVE. Zoraida Barnes DO at bedside for patient evaluation. ISIDRO Falcon DO en route to unit. [facility] CRNA notified of patient request for epidural redose
Verbal order received from Dr. Henry Mcclure to decrease pitocin from 8 mu/min to 2 mu/min
__________________________  13. Do you have any other questions or concerns I can address today?  Y/N  __________________________________________________      Teaching During interview :_____________________________________________  ___________________________RN       Date:______________Time:________________

## 2021-06-11 NOTE — PLAN OF CARE
Problem: VAGINAL DELIVERY - RECOVERY AND POST PARTUM  Goal: Vital signs are medically acceptable  5/71/4533 9920 by Joe Price RN  Outcome: Completed  6/10/2021 0726 by Cesar Marinelli RN  Outcome: Ongoing  Goal: Patient will remain free of falls  9/80/9228 4116 by Joe Price RN  Outcome: Completed  6/10/2021 0726 by Cesar Marinelli RN  Outcome: Ongoing  Goal: Fundus firm at midline  0/27/7542 6756 by Joe Price RN  Outcome: Completed  6/10/2021 0726 by Cesar Marinelli RN  Outcome: Ongoing  Goal: Moderate rubra without clots, no purulent discharge, no foul smelling lochia  2/16/8119 8713 by Joe Price RN  Outcome: Completed  6/10/2021 0726 by Cesar Marinelli RN  Outcome: Ongoing  Goal: Empties bladder  6/87/4096 3574 by Joe Price RN  Outcome: Completed  6/10/2021 0726 by Cesra Marinelli RN  Outcome: Ongoing  Goal: Verbalizes understanding of normal bowel function resumption  7/62/8090 9918 by Joe Price RN  Outcome: Completed  6/10/2021 0726 by Cesar Marinelli RN  Outcome: Ongoing  Goal: Edema will be absent or minimal  0/91/1145 5767 by Joe Price RN  Outcome: Completed  6/10/2021 0726 by Cesar Marinelli RN  Outcome: Ongoing  Goal: Breasts are soft with nipple integrity intact  3/93/0406 3689 by Joe Price RN  Outcome: Completed  6/10/2021 0726 by Cesar Marinelli RN  Outcome: Ongoing  Goal: Demonstrates appropriate breast feeding techniques  5/14/1508 0085 by Joe Price RN  Outcome: Completed  6/10/2021 0726 by Cesar Marinelli RN  Outcome: Ongoing  Goal: Appropriate behavior observed  2/32/1019 4528 by Joe Price RN  Outcome: Completed  6/10/2021 0726 by Cesar Marinelli RN  Outcome: Ongoing  Goal: Positive Mother-Baby interactions are observed  8/45/2577 3599 by Joe Price RN  Outcome: Completed  6/10/2021 0726 by Cesar Maple, RN  Outcome: Ongoing  Goal: Perineum intact without discharge or hematoma  0/05/7053 5537 by Joe Price RN  Outcome:

## 2021-06-30 ENCOUNTER — POSTPARTUM VISIT (OUTPATIENT)
Dept: OBGYN CLINIC | Age: 36
End: 2021-06-30

## 2021-06-30 VITALS
HEART RATE: 73 BPM | SYSTOLIC BLOOD PRESSURE: 124 MMHG | BODY MASS INDEX: 25.82 KG/M2 | WEIGHT: 150.4 LBS | TEMPERATURE: 98.1 F | DIASTOLIC BLOOD PRESSURE: 72 MMHG

## 2021-06-30 DIAGNOSIS — B00.9 HERPES SIMPLEX VIRUS (HSV) INFECTION: ICD-10-CM

## 2021-06-30 DIAGNOSIS — D25.9 UTERINE LEIOMYOMA, UNSPECIFIED LOCATION: ICD-10-CM

## 2021-06-30 PROCEDURE — 0503F POSTPARTUM CARE VISIT: CPT | Performed by: OBSTETRICS & GYNECOLOGY

## 2021-08-01 PROBLEM — O09.523 MULTIGRAVIDA OF ADVANCED MATERNAL AGE IN THIRD TRIMESTER: Status: RESOLVED | Noted: 2021-04-18 | Resolved: 2021-08-01

## 2021-08-01 PROBLEM — Z37.9 NORMAL LABOR: Status: RESOLVED | Noted: 2021-06-09 | Resolved: 2021-08-01

## 2021-08-01 PROBLEM — Z34.83 PRENATAL CARE, SUBSEQUENT PREGNANCY IN THIRD TRIMESTER: Status: RESOLVED | Noted: 2021-04-18 | Resolved: 2021-08-01

## 2021-08-01 PROBLEM — R73.09 ELEVATED GLUCOSE TOLERANCE TEST: Status: RESOLVED | Noted: 2021-04-24 | Resolved: 2021-08-01

## 2021-08-01 ASSESSMENT — ENCOUNTER SYMPTOMS
DIARRHEA: 0
NAUSEA: 0
CONSTIPATION: 0
VOMITING: 0
GASTROINTESTINAL NEGATIVE: 1
ABDOMINAL PAIN: 0
SHORTNESS OF BREATH: 0
RESPIRATORY NEGATIVE: 1

## 2021-08-20 ENCOUNTER — POSTPARTUM VISIT (OUTPATIENT)
Dept: OBGYN CLINIC | Age: 36
End: 2021-08-20

## 2021-08-20 VITALS
DIASTOLIC BLOOD PRESSURE: 70 MMHG | BODY MASS INDEX: 24.72 KG/M2 | TEMPERATURE: 98 F | SYSTOLIC BLOOD PRESSURE: 122 MMHG | WEIGHT: 144 LBS | HEART RATE: 84 BPM

## 2021-08-20 DIAGNOSIS — D25.9 UTERINE LEIOMYOMA, UNSPECIFIED LOCATION: ICD-10-CM

## 2021-08-20 DIAGNOSIS — B00.9 HERPES SIMPLEX VIRUS (HSV) INFECTION: ICD-10-CM

## 2021-08-20 PROCEDURE — 0503F POSTPARTUM CARE VISIT: CPT | Performed by: OBSTETRICS & GYNECOLOGY

## 2021-09-12 PROBLEM — O98.513 HERPES SIMPLEX VIRUS TYPE 2 (HSV-2) INFECTION AFFECTING PREGNANCY IN THIRD TRIMESTER: Status: RESOLVED | Noted: 2021-05-15 | Resolved: 2021-09-12

## 2021-09-12 PROBLEM — B00.9 HERPES SIMPLEX VIRUS TYPE 2 (HSV-2) INFECTION AFFECTING PREGNANCY IN THIRD TRIMESTER: Status: RESOLVED | Noted: 2021-05-15 | Resolved: 2021-09-12

## 2021-09-12 ASSESSMENT — ENCOUNTER SYMPTOMS
DIARRHEA: 0
CONSTIPATION: 0
SHORTNESS OF BREATH: 0
GASTROINTESTINAL NEGATIVE: 1
NAUSEA: 0
VOMITING: 0
RESPIRATORY NEGATIVE: 1
ABDOMINAL PAIN: 0

## 2021-09-13 NOTE — PROGRESS NOTES
Subjective:      Patient ID: Mirna Castillo is a 29 y.o. female. HPI  28 y/o  female presents for postpartum visit. Patient is 8 weeks S/P uncomplicated Vaginal Delivery PPD. The pregnancy was complicated by AMA, elevated 1 hour GTT, and HSV. No current complaints are noted including headache, change in vision, fever, chills, chest pain, shortness of breath, nausea, vomiting, diarrhea or constipation. The patient denies any urinary complaints or calf tenderness. Denies vaginal bleeding. Denies menses. Denies signs or symptoms of depression. Breast feeding. Last pap smear 17--normal, negative testing for high risk HPV. Review of Systems   Constitutional: Negative. Negative for activity change, appetite change, chills, fatigue, fever and unexpected weight change. Eyes: Negative for visual disturbance. Respiratory: Negative. Negative for shortness of breath. Cardiovascular: Negative. Negative for chest pain. Gastrointestinal: Negative. Negative for abdominal pain, constipation, diarrhea, nausea and vomiting. Endocrine: Negative for cold intolerance and heat intolerance. Genitourinary: Negative for difficulty urinating, dysuria, hematuria, pelvic pain, vaginal bleeding, vaginal discharge and vaginal pain. Skin: Negative. Neurological: Negative for headaches. Hematological: Does not bruise/bleed easily. Psychiatric/Behavioral: Negative. Negative for dysphoric mood, self-injury and suicidal ideas. The patient is not nervous/anxious. Objective:   Physical Exam  Vitals and nursing note reviewed. Constitutional:       General: She is not in acute distress. Appearance: Normal appearance. She is well-developed. She is not ill-appearing, toxic-appearing or diaphoretic. Pulmonary:      Effort: Pulmonary effort is normal.   Skin:     General: Skin is warm and dry. Neurological:      Mental Status: She is alert and oriented to person, place, and time. Psychiatric:         Behavior: Behavior normal.         Thought Content: Thought content normal.         Judgment: Judgment normal.         Assessment:       Diagnosis Orders   1. Postpartum care following vaginal delivery     2. Lactating mother     3. Herpes simplex virus (HSV) infection     4. Uterine leiomyoma, unspecified location             Plan:      Declines contraception  Follow up prn and 6 months for well woman examination.          Festus Falcon, DO

## 2022-03-01 ENCOUNTER — OFFICE VISIT (OUTPATIENT)
Dept: OBGYN CLINIC | Age: 37
End: 2022-03-01
Payer: COMMERCIAL

## 2022-03-01 VITALS
HEART RATE: 69 BPM | DIASTOLIC BLOOD PRESSURE: 66 MMHG | WEIGHT: 134 LBS | SYSTOLIC BLOOD PRESSURE: 100 MMHG | BODY MASS INDEX: 23 KG/M2 | TEMPERATURE: 97.4 F

## 2022-03-01 DIAGNOSIS — Z12.4 PAP SMEAR FOR CERVICAL CANCER SCREENING: ICD-10-CM

## 2022-03-01 DIAGNOSIS — D25.9 UTERINE LEIOMYOMA, UNSPECIFIED LOCATION: ICD-10-CM

## 2022-03-01 DIAGNOSIS — B00.9 HERPES SIMPLEX VIRUS (HSV) INFECTION: ICD-10-CM

## 2022-03-01 DIAGNOSIS — Z01.419 WOMEN'S ANNUAL ROUTINE GYNECOLOGICAL EXAMINATION: Primary | ICD-10-CM

## 2022-03-01 PROCEDURE — 99395 PREV VISIT EST AGE 18-39: CPT | Performed by: OBSTETRICS & GYNECOLOGY

## 2022-03-04 LAB
HPV COMMENT: NORMAL
HPV TYPE 16: NOT DETECTED
HPV TYPE 18: NOT DETECTED
HPVOH (OTHER TYPES): NOT DETECTED

## 2022-03-07 ASSESSMENT — ENCOUNTER SYMPTOMS
DIARRHEA: 0
NAUSEA: 0
ABDOMINAL PAIN: 0
CONSTIPATION: 0
VOMITING: 0
SHORTNESS OF BREATH: 0
ABDOMINAL DISTENTION: 0

## 2022-03-07 NOTE — PROGRESS NOTES
Subjective:      Patient ID: Wilfred Christie is a 29 y.o. female. HPI  30 y/o  female presents for well woman examination. Last pap smear 17--normal, negative testing for high risk HPV. No history of abnormal pap smear. Menarche occurred age 16. Menses occur every month x 5-6 days, 4 tampons per day. Resumed menses 4 months postpartum. Patient is 9 months s/p uncomplicated vaginal Delivery. The pregnancy was complicated by AMA, elevated 1 hour GTT, and HSV. Sexually active, no problems, monogamous x 5 years. Declines contraception. Breast feeding. Review of Systems   Constitutional: Negative for activity change, appetite change, chills, fatigue, fever and unexpected weight change. Respiratory: Negative for shortness of breath. Cardiovascular: Negative for chest pain. Gastrointestinal: Negative for abdominal distention, abdominal pain, constipation, diarrhea, nausea and vomiting. Endocrine: Negative for cold intolerance and heat intolerance. Genitourinary: Negative for difficulty urinating, dyspareunia, dysuria, frequency, genital sores, hematuria, menstrual problem, pelvic pain, vaginal bleeding, vaginal discharge and vaginal pain. Skin: Negative for rash. Neurological: Negative for headaches. Hematological: Does not bruise/bleed easily. Objective:   Physical Exam  Vitals and nursing note reviewed. Exam conducted with a chaperone present. Constitutional:       General: She is not in acute distress. Appearance: Normal appearance. She is well-developed. She is not ill-appearing or toxic-appearing. HENT:      Head: Normocephalic and atraumatic. Neck:      Thyroid: No thyromegaly. Trachea: Trachea normal.   Cardiovascular:      Rate and Rhythm: Normal rate and regular rhythm. Heart sounds: Normal heart sounds, S1 normal and S2 normal.   Pulmonary:      Effort: Pulmonary effort is normal. No respiratory distress.       Breath sounds: Normal breath sounds. Chest:   Breasts: Breasts are symmetrical.      Right: No inverted nipple, mass, nipple discharge, skin change or tenderness. Left: No inverted nipple, mass, nipple discharge, skin change or tenderness. Abdominal:      General: Bowel sounds are normal. There is no distension. Palpations: Abdomen is soft. Abdomen is not rigid. There is no mass. Tenderness: There is no abdominal tenderness. There is no guarding or rebound. Genitourinary:     General: Normal vulva. Exam position: Lithotomy position. Labia:         Right: No rash, tenderness, lesion or injury. Left: No rash, tenderness, lesion or injury. Vagina: No signs of injury. No vaginal discharge, erythema, tenderness or bleeding. Cervix: No cervical motion tenderness, discharge or friability. Uterus: Not tender. Adnexa:         Right: No mass, tenderness or fullness. Left: No mass, tenderness or fullness. Musculoskeletal:         General: Normal range of motion. Cervical back: Neck supple. Skin:     General: Skin is warm and dry. Findings: No erythema or rash. Nails: There is no clubbing. Neurological:      Mental Status: She is alert and oriented to person, place, and time. Psychiatric:         Speech: Speech normal.         Behavior: Behavior normal. Behavior is cooperative. Thought Content: Thought content normal.         Judgment: Judgment normal.         Assessment:       Diagnosis Orders   1. Women's annual routine gynecological examination  PAP SMEAR   2. Pap smear for cervical cancer screening  PAP SMEAR   3. Uterine leiomyoma, unspecified location     4. Lactating mother     11.  Herpes simplex virus (HSV) infection             Plan:      Orders Placed This Encounter   Procedures    PAP SMEAR    Human papillomavirus (HPV) DNA probe thin prep high risk    PAP SMEAR     Prenatal vitamin/folic acid recommended  Follow up prn and 1 year for well woman examination.            Kathi Falcon,

## 2022-11-29 ENCOUNTER — OFFICE VISIT (OUTPATIENT)
Dept: OBGYN CLINIC | Age: 37
End: 2022-11-29

## 2022-11-29 ENCOUNTER — OFFICE VISIT (OUTPATIENT)
Dept: OBGYN CLINIC | Age: 37
End: 2022-11-29
Payer: COMMERCIAL

## 2022-11-29 VITALS
TEMPERATURE: 98.1 F | WEIGHT: 131.2 LBS | BODY MASS INDEX: 22.52 KG/M2 | SYSTOLIC BLOOD PRESSURE: 106 MMHG | HEART RATE: 92 BPM | DIASTOLIC BLOOD PRESSURE: 72 MMHG

## 2022-11-29 DIAGNOSIS — O02.1 MISSED ABORTION: ICD-10-CM

## 2022-11-29 DIAGNOSIS — B00.9 HERPES SIMPLEX VIRUS (HSV) INFECTION: ICD-10-CM

## 2022-11-29 DIAGNOSIS — N91.2 AMENORRHEA: ICD-10-CM

## 2022-11-29 DIAGNOSIS — N91.2 AMENORRHEA: Primary | ICD-10-CM

## 2022-11-29 DIAGNOSIS — O09.529 ANTEPARTUM MULTIGRAVIDA OF ADVANCED MATERNAL AGE: ICD-10-CM

## 2022-11-29 DIAGNOSIS — D25.9 UTERINE LEIOMYOMA, UNSPECIFIED LOCATION: ICD-10-CM

## 2022-11-29 DIAGNOSIS — O02.1 MISSED ABORTION: Primary | ICD-10-CM

## 2022-11-29 LAB
CRL: NORMAL
SAC DIAMETER: NORMAL

## 2022-11-29 PROCEDURE — 76801 OB US < 14 WKS SINGLE FETUS: CPT | Performed by: OBSTETRICS & GYNECOLOGY

## 2022-11-30 LAB
BACTERIA: ABNORMAL /HPF
BILIRUBIN URINE: NEGATIVE
BLOOD, URINE: NEGATIVE
CANDIDA SPECIES, DNA PROBE: ABNORMAL
CLARITY: CLEAR
COLOR: YELLOW
EPITHELIAL CELLS, UA: 5 /HPF (ref 0–5)
GARDNERELLA VAGINALIS, DNA PROBE: ABNORMAL
GLUCOSE URINE: NEGATIVE MG/DL
HYALINE CASTS: 0 /LPF (ref 0–8)
KETONES, URINE: NEGATIVE MG/DL
LEUKOCYTE ESTERASE, URINE: ABNORMAL
MICROSCOPIC EXAMINATION: YES
NITRITE, URINE: NEGATIVE
PH UA: 6.5 (ref 5–8)
PROTEIN UA: NEGATIVE MG/DL
RBC UA: 1 /HPF (ref 0–4)
SPECIFIC GRAVITY UA: 1.01 (ref 1–1.03)
TRICHOMONAS VAGINALIS DNA: ABNORMAL
URINE CULTURE, ROUTINE: NORMAL
URINE TYPE: ABNORMAL
UROBILINOGEN, URINE: 0.2 E.U./DL
WBC UA: 2 /HPF (ref 0–5)

## 2022-11-30 RX ORDER — METRONIDAZOLE 500 MG/1
500 TABLET ORAL 2 TIMES DAILY
Qty: 14 TABLET | Refills: 0 | Status: SHIPPED | OUTPATIENT
Start: 2022-11-30 | End: 2022-12-07

## 2022-11-30 NOTE — PROGRESS NOTES
Subjective:      Patient ID: Sandor Melgar is a 40 y.o. female. HPI  41 y/o  female presents for evaluation secondary to missed menses/amenorrhea. Menarche occurred age 16. Menses occur every month x 5-6 days, 4 tampons per day. Resumed menses 4 months postpartum. FDLMP: 10/6/22. Denies vaginal bleeding and vaginal discharge since October. Last pap smear 3/1/22--normal, negative testing for high risk HPV. No history of abnormal pap smear. Patient is 17 months s/p uncomplicated vaginal Delivery. The pregnancy was complicated by AMA, elevated 1 hour GTT, and HSV. Sexually active, no problems, monogamous x 5-6 years. Breast feeding. Review of Systems   Constitutional:  Negative for activity change, appetite change, chills, fatigue, fever and unexpected weight change. Respiratory:  Negative for shortness of breath. Cardiovascular:  Negative for chest pain. Gastrointestinal:  Negative for abdominal distention, abdominal pain, constipation, diarrhea, nausea and vomiting. Endocrine: Negative for cold intolerance and heat intolerance. Genitourinary:  Positive for menstrual problem. Negative for difficulty urinating, dyspareunia, dysuria, frequency, genital sores, hematuria, pelvic pain, vaginal bleeding, vaginal discharge and vaginal pain. Skin:  Negative for rash. Neurological:  Negative for headaches. Hematological:  Does not bruise/bleed easily. Objective:   Physical Exam  Vitals and nursing note reviewed. Exam conducted with a chaperone present. Constitutional:       General: She is not in acute distress. Appearance: Normal appearance. She is well-developed. She is not ill-appearing, toxic-appearing or diaphoretic. HENT:      Head: Normocephalic and atraumatic. Neck:      Thyroid: No thyromegaly. Trachea: Trachea normal.   Cardiovascular:      Rate and Rhythm: Normal rate and regular rhythm.       Heart sounds: Normal heart sounds, S1 normal and S2 normal. Pulmonary:      Effort: Pulmonary effort is normal. No respiratory distress. Breath sounds: Normal breath sounds. Abdominal:      General: Bowel sounds are normal. There is no distension. Palpations: Abdomen is soft. Abdomen is not rigid. There is no mass. Tenderness: There is no abdominal tenderness. There is no guarding or rebound. Genitourinary:     General: Normal vulva. Exam position: Lithotomy position. Labia:         Right: No rash, tenderness, lesion or injury. Left: No rash, tenderness, lesion or injury. Vagina: No signs of injury. No vaginal discharge, erythema, tenderness or bleeding. Cervix: No cervical motion tenderness, discharge or friability. Uterus: Not tender. Adnexa:         Right: No mass, tenderness or fullness. Left: No mass, tenderness or fullness. Rectum: Normal.   Musculoskeletal:         General: Normal range of motion. Cervical back: Neck supple. Skin:     General: Skin is warm and dry. Findings: No erythema or rash. Nails: There is no clubbing. Neurological:      Mental Status: She is alert and oriented to person, place, and time. Psychiatric:         Mood and Affect: Mood normal.         Speech: Speech normal.         Behavior: Behavior normal. Behavior is cooperative. Thought Content: Thought content normal.         Judgment: Judgment normal.     OBSTETRIC ULTRASOUND--1ST TRIMESTER    DATE: 11/29/2022    PHYSICIAN: ISIDRO Falcon D.O.     SONOGRAPHER: Chantell Hale [facility]    INDICATION: Amenorrhea    TYPE OF SCAN:  vaginal    FINDINGS:      The cul de sac is normal.  The cervix is normal.  The uterus is gravid. The uterus measures 10.83 cm x 7.18 cm x 7.33 cm. No uterine anomalies are evident. The right ovary is not visualized. The left ovary is present. There is a single intrauterine pregnancy identified. A fetal pole is noted with a CRL measuring . 69 cm, consistent with gestational age of 7 weeks and 4 days and EDC of 2023. There is a 8 day discrepancy when compared with the gestational age of 7 weeks and 5 days and EDC of 2023 set by FDLMP (10/06/2022). Yolk sac is present and measures . 40 cm. Fetal cardiac activity is absent. IMPRESSION:    Single IUP with NO cardiac activity, most consistent with MAB. Imaging is limited secondary to bowel gas. Patient is well aware of the limitations of ultrasound in the detection of anomalies. Assessment:       Diagnosis Orders   1. Missed         2. Amenorrhea  Culture, Urine    Urinalysis with Microscopic    Vaginal Pathogens Probes *A    POCT urine pregnancy      3. Lactating mother        4. Uterine leiomyoma, unspecified location        5. Antepartum multigravida of advanced maternal age        10. Herpes simplex virus (HSV) infection                Plan:      Orders Placed This Encounter   Procedures    Culture, Urine    Urinalysis with Microscopic    Vaginal Pathogens Probes *A    POCT urine pregnancy     Bleeding and pain precautions reviewed. Options discussed: repeat ultrasound 1 week with surveillance, surveillance/expectant management, cytotec, versus D&C. Risks and benefits reviewed. Follow up prn .            Katharina Falcon DO

## 2022-12-04 PROBLEM — O02.1 MISSED ABORTION: Status: ACTIVE | Noted: 2022-12-04

## 2022-12-04 PROBLEM — O09.529 ANTEPARTUM MULTIGRAVIDA OF ADVANCED MATERNAL AGE: Status: ACTIVE | Noted: 2022-12-04

## 2022-12-04 ASSESSMENT — ENCOUNTER SYMPTOMS
SHORTNESS OF BREATH: 0
NAUSEA: 0
VOMITING: 0
DIARRHEA: 0
CONSTIPATION: 0
ABDOMINAL DISTENTION: 0
ABDOMINAL PAIN: 0

## 2022-12-06 ENCOUNTER — TELEPHONE (OUTPATIENT)
Dept: OBGYN CLINIC | Age: 37
End: 2022-12-06

## 2022-12-06 DIAGNOSIS — O02.1 MISSED ABORTION: Primary | ICD-10-CM

## 2022-12-06 NOTE — TELEPHONE ENCOUNTER
Follow up ultrasound reviewed with patient. See addendum to amenorrhea visit. Missed  confirmed. Patient denies vaginal bleeding, cramping, pain or symptoms. Has trip planned in 1-2 weeks--traveling to [place]. Interested in cytotec as discussed at amenorrhea visit. Rx for cytotec 600 mg po x 1, with repeat dose prn   Order for quantitative HCG and CBC placed. Patient plans on going for lab work in AM.   Bleeding and pain precautions. Patient to call with updates on course.    FYI to staff

## 2022-12-07 ENCOUNTER — NURSE ONLY (OUTPATIENT)
Dept: OBGYN CLINIC | Age: 37
End: 2022-12-07
Payer: COMMERCIAL

## 2022-12-07 DIAGNOSIS — O02.1 MISSED ABORTION: ICD-10-CM

## 2022-12-07 PROCEDURE — 36415 COLL VENOUS BLD VENIPUNCTURE: CPT | Performed by: OBSTETRICS & GYNECOLOGY

## 2022-12-08 LAB — GONADOTROPIN, CHORIONIC (HCG) QUANT: NORMAL MIU/ML

## 2022-12-22 ENCOUNTER — NURSE ONLY (OUTPATIENT)
Dept: OBGYN CLINIC | Age: 37
End: 2022-12-22
Payer: COMMERCIAL

## 2022-12-22 VITALS — WEIGHT: 130.8 LBS | TEMPERATURE: 98.6 F | BODY MASS INDEX: 22.45 KG/M2

## 2022-12-22 DIAGNOSIS — O02.1 MISSED ABORTION: Primary | ICD-10-CM

## 2022-12-22 PROCEDURE — 36415 COLL VENOUS BLD VENIPUNCTURE: CPT | Performed by: OBSTETRICS & GYNECOLOGY

## 2022-12-23 LAB — GONADOTROPIN, CHORIONIC (HCG) QUANT: 40.9 MIU/ML

## 2022-12-25 DIAGNOSIS — O02.1 MISSED ABORTION: Primary | ICD-10-CM

## 2023-01-10 ENCOUNTER — NURSE ONLY (OUTPATIENT)
Dept: OBGYN CLINIC | Age: 38
End: 2023-01-10
Payer: COMMERCIAL

## 2023-01-10 DIAGNOSIS — N91.2 AMENORRHEA: Primary | ICD-10-CM

## 2023-01-10 DIAGNOSIS — O02.1 MISSED ABORTION: ICD-10-CM

## 2023-01-10 PROCEDURE — 36415 COLL VENOUS BLD VENIPUNCTURE: CPT | Performed by: OBSTETRICS & GYNECOLOGY

## 2023-01-11 LAB — GONADOTROPIN, CHORIONIC (HCG) QUANT: <5 MIU/ML
